# Patient Record
Sex: FEMALE | Race: WHITE | HISPANIC OR LATINO | Employment: OTHER | URBAN - METROPOLITAN AREA
[De-identification: names, ages, dates, MRNs, and addresses within clinical notes are randomized per-mention and may not be internally consistent; named-entity substitution may affect disease eponyms.]

---

## 2018-09-28 ENCOUNTER — HOSPITAL ENCOUNTER (OUTPATIENT)
Facility: MEDICAL CENTER | Age: 73
End: 2018-09-28
Attending: OBSTETRICS & GYNECOLOGY | Admitting: OBSTETRICS & GYNECOLOGY
Payer: MEDICARE

## 2018-09-28 VITALS
OXYGEN SATURATION: 95 % | RESPIRATION RATE: 16 BRPM | HEART RATE: 64 BPM | TEMPERATURE: 98 F | BODY MASS INDEX: 36.71 KG/M2 | DIASTOLIC BLOOD PRESSURE: 58 MMHG | HEIGHT: 59 IN | SYSTOLIC BLOOD PRESSURE: 150 MMHG | WEIGHT: 182.1 LBS

## 2018-09-28 PROBLEM — N95.0 POSTMENOPAUSAL BLEEDING: Status: ACTIVE | Noted: 2018-09-28

## 2018-09-28 LAB
ANION GAP SERPL CALC-SCNC: 11 MMOL/L (ref 0–11.9)
BUN SERPL-MCNC: 17 MG/DL (ref 8–22)
CALCIUM SERPL-MCNC: 9.8 MG/DL (ref 8.5–10.5)
CHLORIDE SERPL-SCNC: 102 MMOL/L (ref 96–112)
CO2 SERPL-SCNC: 26 MMOL/L (ref 20–33)
CREAT SERPL-MCNC: 0.97 MG/DL (ref 0.5–1.4)
EKG IMPRESSION: NORMAL
GLUCOSE SERPL-MCNC: 103 MG/DL (ref 65–99)
POTASSIUM SERPL-SCNC: 3.2 MMOL/L (ref 3.6–5.5)
SODIUM SERPL-SCNC: 139 MMOL/L (ref 135–145)

## 2018-09-28 PROCEDURE — 700111 HCHG RX REV CODE 636 W/ 250 OVERRIDE (IP): Performed by: OBSTETRICS & GYNECOLOGY

## 2018-09-28 PROCEDURE — 700111 HCHG RX REV CODE 636 W/ 250 OVERRIDE (IP)

## 2018-09-28 PROCEDURE — 88305 TISSUE EXAM BY PATHOLOGIST: CPT

## 2018-09-28 PROCEDURE — 160025 RECOVERY II MINUTES (STATS): Performed by: OBSTETRICS & GYNECOLOGY

## 2018-09-28 PROCEDURE — 500865 HCHG NEEDLE, SPINAL 20G/22G: Performed by: OBSTETRICS & GYNECOLOGY

## 2018-09-28 PROCEDURE — 700101 HCHG RX REV CODE 250

## 2018-09-28 PROCEDURE — 88360 TUMOR IMMUNOHISTOCHEM/MANUAL: CPT

## 2018-09-28 PROCEDURE — 160002 HCHG RECOVERY MINUTES (STAT): Performed by: OBSTETRICS & GYNECOLOGY

## 2018-09-28 PROCEDURE — 500448 HCHG DRESSING, TELFA 3X4: Performed by: OBSTETRICS & GYNECOLOGY

## 2018-09-28 PROCEDURE — A9270 NON-COVERED ITEM OR SERVICE: HCPCS | Performed by: ANESTHESIOLOGY

## 2018-09-28 PROCEDURE — 93010 ELECTROCARDIOGRAM REPORT: CPT | Performed by: INTERNAL MEDICINE

## 2018-09-28 PROCEDURE — 160009 HCHG ANES TIME/MIN: Performed by: OBSTETRICS & GYNECOLOGY

## 2018-09-28 PROCEDURE — 88342 IMHCHEM/IMCYTCHM 1ST ANTB: CPT

## 2018-09-28 PROCEDURE — 80048 BASIC METABOLIC PNL TOTAL CA: CPT

## 2018-09-28 PROCEDURE — 93005 ELECTROCARDIOGRAM TRACING: CPT | Performed by: OBSTETRICS & GYNECOLOGY

## 2018-09-28 PROCEDURE — 160036 HCHG PACU - EA ADDL 30 MINS PHASE I: Performed by: OBSTETRICS & GYNECOLOGY

## 2018-09-28 PROCEDURE — A6404 STERILE GAUZE > 48 SQ IN: HCPCS | Performed by: OBSTETRICS & GYNECOLOGY

## 2018-09-28 PROCEDURE — 700102 HCHG RX REV CODE 250 W/ 637 OVERRIDE(OP): Performed by: ANESTHESIOLOGY

## 2018-09-28 PROCEDURE — 160046 HCHG PACU - 1ST 60 MINS PHASE II: Performed by: OBSTETRICS & GYNECOLOGY

## 2018-09-28 PROCEDURE — 160029 HCHG SURGERY MINUTES - 1ST 30 MINS LEVEL 4: Performed by: OBSTETRICS & GYNECOLOGY

## 2018-09-28 PROCEDURE — 160041 HCHG SURGERY MINUTES - EA ADDL 1 MIN LEVEL 4: Performed by: OBSTETRICS & GYNECOLOGY

## 2018-09-28 PROCEDURE — 502587 HCHG PACK, D&C: Performed by: OBSTETRICS & GYNECOLOGY

## 2018-09-28 PROCEDURE — 160048 HCHG OR STATISTICAL LEVEL 1-5: Performed by: OBSTETRICS & GYNECOLOGY

## 2018-09-28 PROCEDURE — 160047 HCHG PACU  - EA ADDL 30 MINS PHASE II: Performed by: OBSTETRICS & GYNECOLOGY

## 2018-09-28 PROCEDURE — 160035 HCHG PACU - 1ST 60 MINS PHASE I: Performed by: OBSTETRICS & GYNECOLOGY

## 2018-09-28 RX ORDER — HYDROMORPHONE HYDROCHLORIDE 2 MG/ML
0.2 INJECTION, SOLUTION INTRAMUSCULAR; INTRAVENOUS; SUBCUTANEOUS
Status: DISCONTINUED | OUTPATIENT
Start: 2018-09-28 | End: 2018-09-28 | Stop reason: HOSPADM

## 2018-09-28 RX ORDER — MIDAZOLAM HYDROCHLORIDE 1 MG/ML
1 INJECTION INTRAMUSCULAR; INTRAVENOUS
Status: DISCONTINUED | OUTPATIENT
Start: 2018-09-28 | End: 2018-09-28 | Stop reason: HOSPADM

## 2018-09-28 RX ORDER — OXYCODONE HYDROCHLORIDE AND ACETAMINOPHEN 5; 325 MG/1; MG/1
2 TABLET ORAL
Status: COMPLETED | OUTPATIENT
Start: 2018-09-28 | End: 2018-09-28

## 2018-09-28 RX ORDER — IBUPROFEN 800 MG/1
800 TABLET ORAL EVERY 8 HOURS PRN
Status: ON HOLD | COMMUNITY
End: 2018-12-14

## 2018-09-28 RX ORDER — HYDROMORPHONE HYDROCHLORIDE 2 MG/ML
0.4 INJECTION, SOLUTION INTRAMUSCULAR; INTRAVENOUS; SUBCUTANEOUS
Status: DISCONTINUED | OUTPATIENT
Start: 2018-09-28 | End: 2018-09-28 | Stop reason: HOSPADM

## 2018-09-28 RX ORDER — SODIUM CHLORIDE, SODIUM LACTATE, POTASSIUM CHLORIDE, CALCIUM CHLORIDE 600; 310; 30; 20 MG/100ML; MG/100ML; MG/100ML; MG/100ML
INJECTION, SOLUTION INTRAVENOUS CONTINUOUS
Status: ACTIVE | OUTPATIENT
Start: 2018-09-28 | End: 2018-09-28

## 2018-09-28 RX ORDER — CHLORAL HYDRATE 500 MG
1000 CAPSULE ORAL
Status: ON HOLD | COMMUNITY
End: 2018-12-14

## 2018-09-28 RX ORDER — METOPROLOL SUCCINATE 25 MG/1
25 TABLET, EXTENDED RELEASE ORAL DAILY
COMMUNITY

## 2018-09-28 RX ORDER — HYDRALAZINE HYDROCHLORIDE 20 MG/ML
5 INJECTION INTRAMUSCULAR; INTRAVENOUS
Status: DISCONTINUED | OUTPATIENT
Start: 2018-09-28 | End: 2018-09-28 | Stop reason: HOSPADM

## 2018-09-28 RX ORDER — METOPROLOL TARTRATE 1 MG/ML
1 INJECTION, SOLUTION INTRAVENOUS
Status: DISCONTINUED | OUTPATIENT
Start: 2018-09-28 | End: 2018-09-28 | Stop reason: HOSPADM

## 2018-09-28 RX ORDER — BUPIVACAINE HYDROCHLORIDE 2.5 MG/ML
INJECTION, SOLUTION EPIDURAL; INFILTRATION; INTRACAUDAL
Status: DISCONTINUED | OUTPATIENT
Start: 2018-09-28 | End: 2018-09-28 | Stop reason: HOSPADM

## 2018-09-28 RX ORDER — HALOPERIDOL 5 MG/ML
1 INJECTION INTRAMUSCULAR
Status: DISCONTINUED | OUTPATIENT
Start: 2018-09-28 | End: 2018-09-28 | Stop reason: HOSPADM

## 2018-09-28 RX ORDER — OXYCODONE HYDROCHLORIDE AND ACETAMINOPHEN 5; 325 MG/1; MG/1
1 TABLET ORAL
Status: COMPLETED | OUTPATIENT
Start: 2018-09-28 | End: 2018-09-28

## 2018-09-28 RX ORDER — ONDANSETRON 2 MG/ML
4 INJECTION INTRAMUSCULAR; INTRAVENOUS
Status: DISCONTINUED | OUTPATIENT
Start: 2018-09-28 | End: 2018-09-28 | Stop reason: HOSPADM

## 2018-09-28 RX ORDER — HYDROMORPHONE HYDROCHLORIDE 2 MG/ML
0.1 INJECTION, SOLUTION INTRAMUSCULAR; INTRAVENOUS; SUBCUTANEOUS
Status: DISCONTINUED | OUTPATIENT
Start: 2018-09-28 | End: 2018-09-28 | Stop reason: HOSPADM

## 2018-09-28 RX ORDER — TRIAMTERENE AND HYDROCHLOROTHIAZIDE 75; 50 MG/1; MG/1
1 TABLET ORAL DAILY
COMMUNITY

## 2018-09-28 RX ORDER — SODIUM CHLORIDE, SODIUM LACTATE, POTASSIUM CHLORIDE, CALCIUM CHLORIDE 600; 310; 30; 20 MG/100ML; MG/100ML; MG/100ML; MG/100ML
INJECTION, SOLUTION INTRAVENOUS CONTINUOUS
Status: DISCONTINUED | OUTPATIENT
Start: 2018-09-28 | End: 2018-09-28 | Stop reason: HOSPADM

## 2018-09-28 RX ORDER — POTASSIUM CHLORIDE 20 MEQ/1
20 TABLET, EXTENDED RELEASE ORAL DAILY
COMMUNITY

## 2018-09-28 RX ORDER — LATANOPROST 50 UG/ML
1 SOLUTION/ DROPS OPHTHALMIC
COMMUNITY

## 2018-09-28 RX ORDER — HALOBETASOL PROPIONATE 0.05 %
1 OINTMENT (GRAM) TOPICAL DAILY
Status: ON HOLD | COMMUNITY
End: 2018-12-14

## 2018-09-28 RX ORDER — LABETALOL HYDROCHLORIDE 5 MG/ML
5 INJECTION, SOLUTION INTRAVENOUS
Status: DISCONTINUED | OUTPATIENT
Start: 2018-09-28 | End: 2018-09-28 | Stop reason: HOSPADM

## 2018-09-28 RX ADMIN — LIDOCAINE HYDROCHLORIDE 0.5 ML: 10 INJECTION, SOLUTION EPIDURAL; INFILTRATION; INTRACAUDAL; PERINEURAL at 08:05

## 2018-09-28 RX ADMIN — OXYCODONE AND ACETAMINOPHEN 2 TABLET: 5; 325 TABLET ORAL at 10:10

## 2018-09-28 RX ADMIN — SODIUM CHLORIDE, SODIUM LACTATE, POTASSIUM CHLORIDE, CALCIUM CHLORIDE: 600; 310; 30; 20 INJECTION, SOLUTION INTRAVENOUS at 08:05

## 2018-09-28 ASSESSMENT — PAIN SCALES - GENERAL
PAINLEVEL_OUTOF10: 2
PAINLEVEL_OUTOF10: 0
PAINLEVEL_OUTOF10: 2
PAINLEVEL_OUTOF10: 0
PAINLEVEL_OUTOF10: 2
PAINLEVEL_OUTOF10: 0
PAINLEVEL_OUTOF10: 0

## 2018-09-28 NOTE — OP REPORT
DATE OF SERVICE:  09/28/2018    PREOPERATIVE DIAGNOSES:  1.  Postmenopausal bleeding.  2.  Thickened endometrium.    POSTOPERATIVE DIAGNOSES:  1.  Postmenopausal bleeding.  2.  Thickened endometrium.    PROCEDURE PERFORMED:  Diagnostic hysteroscopy with dilation and curettage.    SURGEON:  Mickey Wray MD    ANESTHESIOLOGIST:  Abel Cramer DO    ANESTHESIA:  General with ET tube.    ESTIMATED BLOOD LOSS:  10 mL.    COMPLICATIONS:  None.    SPECIMENS:  Endometrial curettings.    INDICATIONS:  The patient is a 72-year-old postmenopausal woman who complains   of 2 months of intermittent dark blood tinged discharge.  She underwent a   transvaginal ultrasound, which showed a thickened endometrial lining at 24 mm.    She presented to clinic for endometrial biopsy, which was abandoned in the   office due to cervical stenosis and patient discomfort.  The decision was made   to proceed with diagnostic hysteroscopy and dilation and curettage in the   operating room.    FINDINGS:  Thickened endometrium, possible polyp.    PROCEDURE IN DETAIL:  Patient was taken to the operating room where her   general anesthesia was found to be adequate.  She was prepped and draped in   the normal sterile fashion and placed in the dorsal lithotomy position in   Helio stirrups.  A sterile speculum was placed in the vagina.  The anterior   lip of the cervix was grasped with a sterile tenaculum.  An 8 mL of 0.25%   Marcaine with epinephrine was injected at 4 and 8 o'clock, taking care to   first aspirate to ensure that no lidocaine or epinephrine was injected   intravascularly.  The cervix was again noted to be stenosed.  Using lacrimal   duct probes, the cervix was opened and thus dilated to accommodate the   diagnostic hysteroscope.  The diagnostic hysteroscope was passed and a   polypoid structure was noted in a thickened fluffy endometrium.  The   diagnostic hysteroscope was removed.  A sharp curette was used to do a careful    global curettage until a gritty texture was felt throughout.  The polypoid   structure was noted to be removed and the background endometrium as well as   the presumed polyp was sent to pathology.  The cervix was noted to be   hemostatic.  The patient woke from anesthesia without difficulty and was taken   to the PACU in stable condition.       ____________________________________     MD FLORY Wetzel / LOKESH    DD:  09/28/2018 09:21:14  DT:  09/28/2018 09:48:54    D#:  [id number]  Job#:  [id number]

## 2018-09-28 NOTE — DISCHARGE INSTRUCTIONS
ACTIVITY: Rest and take it easy for the first 24 hours.  A responsible adult is recommended to remain with you during that time.  It is normal to feel sleepy.  We encourage you to not do anything that requires balance, judgment or coordination.    MILD FLU-LIKE SYMPTOMS ARE NORMAL. YOU MAY EXPERIENCE GENERALIZED MUSCLE ACHES, THROAT IRRITATION, HEADACHE AND/OR SOME NAUSEA.    FOR 24 HOURS DO NOT:  Drive, operate machinery or run household appliances.  Drink beer or alcoholic beverages.   Make important decisions or sign legal documents.    SPECIAL INSTRUCTIONS:   Patient Discharge Instructions:  No heavy lifting for 1 week  Pelvic rest (no tampons, no douche, no baths, no intercourse) for 1 week.  Call Dr. Wray office at [phone] for heavy vaginal bleeding or fever > 100.5 Degrees F      DIET: To avoid nausea, slowly advance diet as tolerated, avoiding spicy or greasy foods for the first day.  Add more substantial food to your diet according to your physician's instructions.  Babies can be fed formula or breast milk as soon as they are hungry.  INCREASE FLUIDS AND FIBER TO AVOID CONSTIPATION.    SURGICAL DRESSING/BATHING: No baths for 1 week, showering is ok.    FOLLOW-UP APPOINTMENT:  A follow-up appointment should be arranged with your doctor in 1-2 weeks call to schedule.    You should CALL YOUR PHYSICIAN if you develop:  Fever greater than 101 degrees F.  Pain not relieved by medication, or persistent nausea or vomiting.  Excessive bleeding (blood soaking through dressing) or unexpected drainage from the wound.  Extreme redness or swelling around the incision site, drainage of pus or foul smelling drainage.  Inability to urinate or empty your bladder within 8 hours.  Problems with breathing or chest pain.    You should call 911 if you develop problems with breathing or chest pain.  If you are unable to contact your doctor or surgical center, you should go to the nearest emergency room or urgent care  center.  Physician's telephone #: [phone].    If any questions arise, call your doctor.  If your doctor is not available, please feel free to call the Surgical Center at [phone].  The Center is open Monday through Friday from 7AM to 7PM.  You can also call the HEALTH HOTLINE open 24 hours/day, 7 days/week and speak to a nurse at [phone], or toll free at [phone].    A registered nurse may call you a few days after your surgery to see how you are doing after your procedure.    MEDICATIONS: Resume taking daily medication.  Take prescribed pain medication with food.  If no medication is prescribed, you may take non-aspirin pain medication if needed.  PAIN MEDICATION CAN BE VERY CONSTIPATING.  Take a stool softener or laxative such as senokot, pericolace, or milk of magnesia if needed.    No prescriptions given,  Last pain medication given at 10:10 am.    If your physician has prescribed pain medication that includes Acetaminophen (Tylenol), do not take additional Acetaminophen (Tylenol) while taking the prescribed medication.    Depression / Suicide Risk    As you are discharged from this [facility] facility, it is important to learn how to keep safe from harming yourself.    Recognize the warning signs:  · Abrupt changes in personality, positive or negative- including increase in energy   · Giving away possessions  · Change in eating patterns- significant weight changes-  positive or negative  · Change in sleeping patterns- unable to sleep or sleeping all the time   · Unwillingness or inability to communicate  · Depression  · Unusual sadness, discouragement and loneliness  · Talk of wanting to die  · Neglect of personal appearance   · Rebelliousness- reckless behavior  · Withdrawal from people/activities they love  · Confusion- inability to concentrate     If you or a loved one observes any of these behaviors or has concerns about self-harm, here's what you can do:  · Talk about it- your  feelings and reasons for harming yourself  · Remove any means that you might use to hurt yourself (examples: pills, rope, extension cords, firearm)  · Get professional help from the community (Mental Health, Substance Abuse, psychological counseling)  · Do not be alone:Call your Safe Contact- someone whom you trust who will be there for you.  · Call your local CRISIS HOTLINE 808-7274 or 892-485-1324  · Call your local Children's Mobile Crisis Response Team [place] (820) 622-1246 or www.[employer]  · Call the toll free National Suicide Prevention Hotlines   · National Suicide Prevention Lifeline 028-258-NCYX (3874)  · National Hope Line Network 800-SUICIDE (817-4472)

## 2018-09-28 NOTE — OR NURSING
POSTOP D & C    PT ARRIVED SLEEPY WITH VAGINAL PERIPAD WITH MESH UNDERGARMENTS IN PLACE, SCANT SEROSANGUINEOUS DRAINAGE OBSERVED. PT AWOKE AND DENIED NAUSEA OR PAIN, PO FLUIDS BEGAN. PT REPORTED MINIMAL SURGICAL SITE DISCOMFORT, SALTINES GIVEN TO PREPARE FOR PO PRN PAIN MEDS FOR DISCHARGE PLANNING.    PT TOLERATED PO FLUIDS, SNACK AND PO PRN PAIN MEDICATION, IS X 10 BEGAN IN PHASE I, PT REPORTED USING HOME 02 RECENTLY BUT STOPPED DUE TO COSTS, HEALTH HISTORY OF SOB WHICH PT REPORTED RELATED TO ALLERGIES. IS X 10 Q 15 MIN AWAKE FOR DISCHARGE PLANNING, PT WAS SUCCESSFUL IN TEACH BACK AND IS AWARE THIS IMPORTANT FOR NEXT 2-3 POSTOP DAYS TO PREVENT PNEUMONIA.    PT MET ASPAN PHASE I CRITERIA, FRIEND WAS UPDATED AND CAME TO SURGICAL DESK FOR DISCHARGE INSTRUCTIONS AND TO ASSIST PT AT HOME. PT DOES USE WALKER AT HOME DUE DID NOT BRING TO HOSPITAL TODAY.

## 2018-09-28 NOTE — OP REPORT
Short Op Note    Procedure date: 9/28/2018  Pre-op diagnosis: postmenopausal bleeding, thickened EMS  Post-op diagnosis: same    Procedure: Hysteroscopy D&C  Surgeon: Nils  EBL: 10 cc  Complications: none  Specimens: Endometrial curettings

## 2018-09-28 NOTE — OR NURSING
Pt up with standby assist to bathroom. Slight serosanguinous drainage on cuco pad. Discussed with pt that she has some oxygen desaturation when sleeping, pt states she has sleep apnea and has a machine at home (that she has stopped using) but she will start using again tonight. Pt discharged to friend khoi, taken to car by wheelchair. Discharge instructions given to pt and friend, no further questions.

## 2018-10-17 ENCOUNTER — TELEPHONE (OUTPATIENT)
Dept: OTHER | Facility: MEDICAL CENTER | Age: 73
End: 2018-10-17

## 2018-10-17 NOTE — TELEPHONE ENCOUNTER
Telephone call to pt to follow up after surgery.  No answer, left message requesting return call.

## 2018-12-14 ENCOUNTER — APPOINTMENT (OUTPATIENT)
Dept: RADIOLOGY | Facility: MEDICAL CENTER | Age: 73
End: 2018-12-14
Attending: NURSE PRACTITIONER
Payer: MEDICARE

## 2018-12-14 ENCOUNTER — APPOINTMENT (OUTPATIENT)
Dept: RADIOLOGY | Facility: MEDICAL CENTER | Age: 73
End: 2018-12-14
Attending: SPECIALIST
Payer: MEDICARE

## 2018-12-14 ENCOUNTER — HOSPITAL ENCOUNTER (OUTPATIENT)
Facility: MEDICAL CENTER | Age: 73
End: 2018-12-14
Attending: SPECIALIST | Admitting: SPECIALIST
Payer: MEDICARE

## 2018-12-14 VITALS
HEIGHT: 59 IN | HEART RATE: 59 BPM | OXYGEN SATURATION: 92 % | WEIGHT: 172.18 LBS | BODY MASS INDEX: 34.71 KG/M2 | TEMPERATURE: 97.6 F | RESPIRATION RATE: 18 BRPM

## 2018-12-14 DIAGNOSIS — G89.18 POSTOPERATIVE PAIN: ICD-10-CM

## 2018-12-14 LAB
ABO GROUP BLD: NORMAL
ABO GROUP BLD: NORMAL
ALBUMIN SERPL BCP-MCNC: 4.6 G/DL (ref 3.2–4.9)
ALBUMIN/GLOB SERPL: 1.4 G/DL
ALP SERPL-CCNC: 72 U/L (ref 30–99)
ALT SERPL-CCNC: 53 U/L (ref 2–50)
ANION GAP SERPL CALC-SCNC: 11 MMOL/L (ref 0–11.9)
APTT PPP: 30.5 SEC (ref 24.7–36)
AST SERPL-CCNC: 32 U/L (ref 12–45)
BASOPHILS # BLD AUTO: 0.6 % (ref 0–1.8)
BASOPHILS # BLD: 0.05 K/UL (ref 0–0.12)
BILIRUB SERPL-MCNC: 0.7 MG/DL (ref 0.1–1.5)
BLD GP AB SCN SERPL QL: NORMAL
BUN SERPL-MCNC: 13 MG/DL (ref 8–22)
CALCIUM SERPL-MCNC: 9.9 MG/DL (ref 8.5–10.5)
CANCER AG125 SERPL-ACNC: 21.4 U/ML (ref 0–35)
CHLORIDE SERPL-SCNC: 99 MMOL/L (ref 96–112)
CO2 SERPL-SCNC: 28 MMOL/L (ref 20–33)
CREAT SERPL-MCNC: 0.71 MG/DL (ref 0.5–1.4)
EOSINOPHIL # BLD AUTO: 0.11 K/UL (ref 0–0.51)
EOSINOPHIL NFR BLD: 1.3 % (ref 0–6.9)
ERYTHROCYTE [DISTWIDTH] IN BLOOD BY AUTOMATED COUNT: 43.2 FL (ref 35.9–50)
GLOBULIN SER CALC-MCNC: 3.2 G/DL (ref 1.9–3.5)
GLUCOSE SERPL-MCNC: 95 MG/DL (ref 65–99)
HCT VFR BLD AUTO: 43 % (ref 37–47)
HGB BLD-MCNC: 13.8 G/DL (ref 12–16)
IMM GRANULOCYTES # BLD AUTO: 0.03 K/UL (ref 0–0.11)
IMM GRANULOCYTES NFR BLD AUTO: 0.4 % (ref 0–0.9)
INR PPP: 1.04 (ref 0.87–1.13)
LYMPHOCYTES # BLD AUTO: 2.38 K/UL (ref 1–4.8)
LYMPHOCYTES NFR BLD: 28.7 % (ref 22–41)
MCH RBC QN AUTO: 29.5 PG (ref 27–33)
MCHC RBC AUTO-ENTMCNC: 32.1 G/DL (ref 33.6–35)
MCV RBC AUTO: 91.9 FL (ref 81.4–97.8)
MONOCYTES # BLD AUTO: 0.61 K/UL (ref 0–0.85)
MONOCYTES NFR BLD AUTO: 7.4 % (ref 0–13.4)
NEUTROPHILS # BLD AUTO: 5.11 K/UL (ref 2–7.15)
NEUTROPHILS NFR BLD: 61.6 % (ref 44–72)
NRBC # BLD AUTO: 0 K/UL
NRBC BLD-RTO: 0 /100 WBC
PATHOLOGY CONSULT NOTE: NORMAL
PLATELET # BLD AUTO: 270 K/UL (ref 164–446)
PMV BLD AUTO: 9.6 FL (ref 9–12.9)
POTASSIUM SERPL-SCNC: 3.3 MMOL/L (ref 3.6–5.5)
PROT SERPL-MCNC: 7.8 G/DL (ref 6–8.2)
PROTHROMBIN TIME: 13.7 SEC (ref 12–14.6)
RBC # BLD AUTO: 4.68 M/UL (ref 4.2–5.4)
RH BLD: NORMAL
RH BLD: NORMAL
SODIUM SERPL-SCNC: 138 MMOL/L (ref 135–145)
WBC # BLD AUTO: 8.3 K/UL (ref 4.8–10.8)

## 2018-12-14 PROCEDURE — 88112 CYTOPATH CELL ENHANCE TECH: CPT

## 2018-12-14 PROCEDURE — 88309 TISSUE EXAM BY PATHOLOGIST: CPT

## 2018-12-14 PROCEDURE — 160042 HCHG SURGERY MINUTES - EA ADDL 1 MIN LEVEL 5: Performed by: SPECIALIST

## 2018-12-14 PROCEDURE — 88331 PATH CONSLTJ SURG 1 BLK 1SPC: CPT

## 2018-12-14 PROCEDURE — A9270 NON-COVERED ITEM OR SERVICE: HCPCS

## 2018-12-14 PROCEDURE — 700111 HCHG RX REV CODE 636 W/ 250 OVERRIDE (IP): Performed by: ANESTHESIOLOGY

## 2018-12-14 PROCEDURE — 700102 HCHG RX REV CODE 250 W/ 637 OVERRIDE(OP): Performed by: SPECIALIST

## 2018-12-14 PROCEDURE — 86850 RBC ANTIBODY SCREEN: CPT

## 2018-12-14 PROCEDURE — 88342 IMHCHEM/IMCYTCHM 1ST ANTB: CPT | Mod: XU

## 2018-12-14 PROCEDURE — 85730 THROMBOPLASTIN TIME PARTIAL: CPT

## 2018-12-14 PROCEDURE — 85610 PROTHROMBIN TIME: CPT

## 2018-12-14 PROCEDURE — 700101 HCHG RX REV CODE 250

## 2018-12-14 PROCEDURE — 80053 COMPREHEN METABOLIC PANEL: CPT

## 2018-12-14 PROCEDURE — 74018 RADEX ABDOMEN 1 VIEW: CPT

## 2018-12-14 PROCEDURE — 700111 HCHG RX REV CODE 636 W/ 250 OVERRIDE (IP)

## 2018-12-14 PROCEDURE — 502779 HCHG SUTURE, QUILL: Performed by: SPECIALIST

## 2018-12-14 PROCEDURE — 160035 HCHG PACU - 1ST 60 MINS PHASE I: Performed by: SPECIALIST

## 2018-12-14 PROCEDURE — 700105 HCHG RX REV CODE 258: Performed by: SPECIALIST

## 2018-12-14 PROCEDURE — 160048 HCHG OR STATISTICAL LEVEL 1-5: Performed by: SPECIALIST

## 2018-12-14 PROCEDURE — 160047 HCHG PACU  - EA ADDL 30 MINS PHASE II: Performed by: SPECIALIST

## 2018-12-14 PROCEDURE — 88305 TISSUE EXAM BY PATHOLOGIST: CPT

## 2018-12-14 PROCEDURE — 86901 BLOOD TYPING SEROLOGIC RH(D): CPT

## 2018-12-14 PROCEDURE — 88341 IMHCHEM/IMCYTCHM EA ADD ANTB: CPT | Mod: 91

## 2018-12-14 PROCEDURE — 86900 BLOOD TYPING SEROLOGIC ABO: CPT

## 2018-12-14 PROCEDURE — 85025 COMPLETE CBC W/AUTO DIFF WBC: CPT

## 2018-12-14 PROCEDURE — 71045 X-RAY EXAM CHEST 1 VIEW: CPT

## 2018-12-14 PROCEDURE — 160002 HCHG RECOVERY MINUTES (STAT): Performed by: SPECIALIST

## 2018-12-14 PROCEDURE — 700102 HCHG RX REV CODE 250 W/ 637 OVERRIDE(OP)

## 2018-12-14 PROCEDURE — 88307 TISSUE EXAM BY PATHOLOGIST: CPT

## 2018-12-14 PROCEDURE — 160031 HCHG SURGERY MINUTES - 1ST 30 MINS LEVEL 5: Performed by: SPECIALIST

## 2018-12-14 PROCEDURE — 500854 HCHG NEEDLE, INSUFFLATION FOR STEP: Performed by: SPECIALIST

## 2018-12-14 PROCEDURE — 86304 IMMUNOASSAY TUMOR CA 125: CPT

## 2018-12-14 PROCEDURE — 501582 HCHG TROCAR, THRD BLADED: Performed by: SPECIALIST

## 2018-12-14 PROCEDURE — 88360 TUMOR IMMUNOHISTOCHEM/MANUAL: CPT

## 2018-12-14 PROCEDURE — 160046 HCHG PACU - 1ST 60 MINS PHASE II: Performed by: SPECIALIST

## 2018-12-14 PROCEDURE — 88332 PATH CONSLTJ SURG EA ADD BLK: CPT

## 2018-12-14 PROCEDURE — 160009 HCHG ANES TIME/MIN: Performed by: SPECIALIST

## 2018-12-14 PROCEDURE — 160025 RECOVERY II MINUTES (STATS): Performed by: SPECIALIST

## 2018-12-14 PROCEDURE — 700104 HCHG RX REV CODE 254

## 2018-12-14 PROCEDURE — 160036 HCHG PACU - EA ADDL 30 MINS PHASE I: Performed by: SPECIALIST

## 2018-12-14 PROCEDURE — A9270 NON-COVERED ITEM OR SERVICE: HCPCS | Performed by: SPECIALIST

## 2018-12-14 PROCEDURE — 700101 HCHG RX REV CODE 250: Performed by: SPECIALIST

## 2018-12-14 PROCEDURE — 502714 HCHG ROBOTIC SURGERY SERVICES: Performed by: SPECIALIST

## 2018-12-14 PROCEDURE — 500864 HCHG NEEDLE, SPINAL 18G: Performed by: SPECIALIST

## 2018-12-14 PROCEDURE — 501838 HCHG SUTURE GENERAL: Performed by: SPECIALIST

## 2018-12-14 RX ORDER — DIPHENHYDRAMINE HYDROCHLORIDE 50 MG/ML
12.5 INJECTION INTRAMUSCULAR; INTRAVENOUS
Status: DISCONTINUED | OUTPATIENT
Start: 2018-12-14 | End: 2018-12-15 | Stop reason: HOSPADM

## 2018-12-14 RX ORDER — ONDANSETRON 4 MG/1
4 TABLET, ORALLY DISINTEGRATING ORAL EVERY 6 HOURS PRN
Qty: 10 TAB | Refills: 0 | Status: SHIPPED | OUTPATIENT
Start: 2018-12-14 | End: 2022-06-30

## 2018-12-14 RX ORDER — INDOCYANINE GREEN AND WATER 25 MG
KIT INJECTION
Status: DISCONTINUED | OUTPATIENT
Start: 2018-12-14 | End: 2018-12-14 | Stop reason: HOSPADM

## 2018-12-14 RX ORDER — BUPIVACAINE HYDROCHLORIDE AND EPINEPHRINE 2.5; 5 MG/ML; UG/ML
INJECTION, SOLUTION EPIDURAL; INFILTRATION; INTRACAUDAL; PERINEURAL
Status: DISCONTINUED | OUTPATIENT
Start: 2018-12-14 | End: 2018-12-14 | Stop reason: HOSPADM

## 2018-12-14 RX ORDER — OXYCODONE HYDROCHLORIDE AND ACETAMINOPHEN 5; 325 MG/1; MG/1
1 TABLET ORAL EVERY 4 HOURS PRN
Status: DISCONTINUED | OUTPATIENT
Start: 2018-12-14 | End: 2018-12-15 | Stop reason: HOSPADM

## 2018-12-14 RX ORDER — LIDOCAINE HYDROCHLORIDE 10 MG/ML
INJECTION, SOLUTION INFILTRATION; PERINEURAL
Status: COMPLETED
Start: 2018-12-14 | End: 2018-12-14

## 2018-12-14 RX ORDER — OXYCODONE AND ACETAMINOPHEN 7.5; 325 MG/1; MG/1
1 TABLET ORAL EVERY 6 HOURS PRN
Qty: 56 TAB | Refills: 0 | Status: SHIPPED | OUTPATIENT
Start: 2018-12-14 | End: 2018-12-28

## 2018-12-14 RX ORDER — OXYCODONE HCL 20 MG/ML
5 CONCENTRATE, ORAL ORAL
Status: DISCONTINUED | OUTPATIENT
Start: 2018-12-14 | End: 2018-12-15 | Stop reason: HOSPADM

## 2018-12-14 RX ORDER — SODIUM CHLORIDE, SODIUM LACTATE, POTASSIUM CHLORIDE, CALCIUM CHLORIDE 600; 310; 30; 20 MG/100ML; MG/100ML; MG/100ML; MG/100ML
INJECTION, SOLUTION INTRAVENOUS CONTINUOUS
Status: ACTIVE | OUTPATIENT
Start: 2018-12-14 | End: 2018-12-14

## 2018-12-14 RX ORDER — ONDANSETRON 2 MG/ML
4 INJECTION INTRAMUSCULAR; INTRAVENOUS
Status: COMPLETED | OUTPATIENT
Start: 2018-12-14 | End: 2018-12-14

## 2018-12-14 RX ORDER — LABETALOL HYDROCHLORIDE 5 MG/ML
5 INJECTION, SOLUTION INTRAVENOUS
Status: ACTIVE | OUTPATIENT
Start: 2018-12-14 | End: 2018-12-14

## 2018-12-14 RX ORDER — OXYCODONE HCL 20 MG/ML
10 CONCENTRATE, ORAL ORAL
Status: DISCONTINUED | OUTPATIENT
Start: 2018-12-14 | End: 2018-12-15 | Stop reason: HOSPADM

## 2018-12-14 RX ORDER — MEPERIDINE HYDROCHLORIDE 25 MG/ML
6.25 INJECTION INTRAMUSCULAR; INTRAVENOUS; SUBCUTANEOUS
Status: DISCONTINUED | OUTPATIENT
Start: 2018-12-14 | End: 2018-12-15 | Stop reason: HOSPADM

## 2018-12-14 RX ORDER — SODIUM CHLORIDE, SODIUM LACTATE, POTASSIUM CHLORIDE, CALCIUM CHLORIDE 600; 310; 30; 20 MG/100ML; MG/100ML; MG/100ML; MG/100ML
INJECTION, SOLUTION INTRAVENOUS CONTINUOUS
Status: DISCONTINUED | OUTPATIENT
Start: 2018-12-14 | End: 2018-12-15 | Stop reason: HOSPADM

## 2018-12-14 RX ORDER — OXYCODONE HCL 5 MG/5 ML
SOLUTION, ORAL ORAL
Status: COMPLETED
Start: 2018-12-14 | End: 2018-12-14

## 2018-12-14 RX ORDER — HYDROMORPHONE HYDROCHLORIDE 1 MG/ML
0.2 INJECTION, SOLUTION INTRAMUSCULAR; INTRAVENOUS; SUBCUTANEOUS
Status: DISCONTINUED | OUTPATIENT
Start: 2018-12-14 | End: 2018-12-15 | Stop reason: HOSPADM

## 2018-12-14 RX ORDER — IBUPROFEN 200 MG
600 TABLET ORAL
COMMUNITY
End: 2022-06-30

## 2018-12-14 RX ADMIN — HYDROMORPHONE HYDROCHLORIDE 0.2 MG: 1 INJECTION, SOLUTION INTRAMUSCULAR; INTRAVENOUS; SUBCUTANEOUS at 12:32

## 2018-12-14 RX ADMIN — SODIUM CHLORIDE, SODIUM LACTATE, POTASSIUM CHLORIDE, CALCIUM CHLORIDE: 600; 310; 30; 20 INJECTION, SOLUTION INTRAVENOUS at 06:40

## 2018-12-14 RX ADMIN — LIDOCAINE HYDROCHLORIDE 0.5 ML: 10 INJECTION, SOLUTION INFILTRATION; PERINEURAL at 06:40

## 2018-12-14 RX ADMIN — Medication 0.5 ML: at 06:40

## 2018-12-14 RX ADMIN — HYDROMORPHONE HYDROCHLORIDE 0.2 MG: 1 INJECTION, SOLUTION INTRAMUSCULAR; INTRAVENOUS; SUBCUTANEOUS at 12:22

## 2018-12-14 RX ADMIN — OXYCODONE HYDROCHLORIDE 10 MG: 5 SOLUTION ORAL at 11:42

## 2018-12-14 RX ADMIN — ONDANSETRON 4 MG: 2 INJECTION INTRAMUSCULAR; INTRAVENOUS at 11:46

## 2018-12-14 ASSESSMENT — PAIN SCALES - GENERAL
PAINLEVEL_OUTOF10: 0
PAINLEVEL_OUTOF10: 2
PAINLEVEL_OUTOF10: 0
PAINLEVEL_OUTOF10: 3
PAINLEVEL_OUTOF10: 3
PAINLEVEL_OUTOF10: 6
PAINLEVEL_OUTOF10: 2
PAINLEVEL_OUTOF10: 5
PAINLEVEL_OUTOF10: 5

## 2018-12-14 NOTE — OR NURSING
Georgia has done well in recovery. She was medicated with IV and oral pain medication. Tolerating po fluids well and scripts are on the chart. She has slept for most of her recovery period. Given 500 ml bolus of LR for hypotension during her first hour of recovery. Blood pressure is now stable. Site to abdomen with scant blood, site to right side reinforced. Ice pack placed to abdomen. She is stable and ready to move to stage 2.

## 2018-12-14 NOTE — OR SURGEON
Immediate Post OP Note    PreOp Diagnosis: grade 1 endometrial cancer    PostOp Diagnosis: same    Procedure(s):  HYSTERECTOMY ROBOTIC XI - Wound Class: Clean Contaminated  SALPINGECTOMY - Wound Class: Clean Contaminated  OOPHORECTOMY - Wound Class: Clean Contaminated  NODE BIOPSY SENTINEL - Wound Class: Clean Contaminated    Surgeon(s):  Ab Trevino M.D.    Anesthesiologist/Type of Anesthesia:  Anesthesiologist: Abel Thomas Jr., M.D./General    Surgical Staff:  Circulator: Teresa Ordonez R.N.  Relief Circulator: Shamika Hernandez R.N.  Scrub Person: Zack Emmanuel R.N.; Rosy Aguiar    Specimens removed if any:  ID Type Source Tests Collected by Time Destination   A : pelvic washings Body Fluid Abdominal PATHOLOGY SPECIMEN, CYTOLOGY Ab Trevino M.D. 12/14/2018  8:51 AM    B : right lateral external iliac lymph node and left obturator node Tissue Abdominal PATHOLOGY SPECIMEN Ab Trevino M.D. 12/14/2018  8:55 AM    C : uterus, bilateral tubes and ovaries Tissue Vaginal PATHOLOGY SPECIMEN Ab Trevino M.D. 12/14/2018  9:20 AM        Estimated Blood Loss: 100 cc    Findings: left obturator sentinel node and right lateral external iliac node    Complications: none        12/14/2018 10:12 AM Ab Trevino M.D.

## 2018-12-14 NOTE — DISCHARGE INSTRUCTIONS
ACTIVITY: Rest and take it easy for the first 24 hours.  A responsible adult is recommended to remain with you during that time.  It is normal to feel sleepy.  We encourage you to not do anything that requires balance, judgment or coordination.    MILD FLU-LIKE SYMPTOMS ARE NORMAL. YOU MAY EXPERIENCE GENERALIZED MUSCLE ACHES, THROAT IRRITATION, HEADACHE AND/OR SOME NAUSEA.    FOR 24 HOURS DO NOT:  Drive, operate machinery or run household appliances.  Drink beer or alcoholic beverages.   Make important decisions or sign legal documents.    SPECIAL INSTRUCTIONS & SURGICAL DRESSING/BATHIN) Call [phone] to confirm you have a two week post operative examination   2) No heavy lifting greater than 10 pounds for a minimum of six weeks   3) Nothing in the vaginal for a minimum of six weeks   4) No driving while taking pain medication   5) Use Milk of Magnesia for constipation   6) May remove dressings post op day 1 (12/15/18)  7) Call [phone] for any concerns or issues    DIET: To avoid nausea, slowly advance diet as tolerated, avoiding spicy or greasy foods for the first day.  Add more substantial food to your diet according to your physician's instructions.  INCREASE FLUIDS AND FIBER TO AVOID CONSTIPATION.    FOLLOW-UP APPOINTMENT:  A follow-up appointment should be arranged with your doctor; call to schedule.    You should CALL YOUR PHYSICIAN if you develop:  Fever greater than 101 degrees F.  Pain not relieved by medication, or persistent nausea or vomiting.  Excessive bleeding (blood soaking through dressing) or unexpected drainage from the wound.  Extreme redness or swelling around the incision site, drainage of pus or foul smelling drainage.  Inability to urinate or empty your bladder within 8 hours.  Problems with breathing or chest pain.    You should call 911 if you develop problems with breathing or chest pain.  If you are unable to contact your doctor or surgical center, you should go to the  nearest emergency room or urgent care center.  Physician's telephone #: Dr. Trevino [phone]*    If any questions arise, call your doctor.  If your doctor is not available, please feel free to call the Surgical Center at [phone].  The Center is open Monday through Friday from 7AM to 7PM.  You can also call the HEALTH HOTLINE open 24 hours/day, 7 days/week and speak to a nurse at [phone], or toll free at [phone].    A registered nurse may call you a few days after your surgery to see how you are doing after your procedure.    MEDICATIONS: Resume taking daily medication.  Take prescribed pain medication with food.  If no medication is prescribed, you may take non-aspirin pain medication if needed.  PAIN MEDICATION CAN BE VERY CONSTIPATING.  Take a stool softener or laxative such as senokot, pericolace, or milk of magnesia if needed.    Prescription given for *Zofran and Percocet*.  Last pain medication given at 11:42.    If your physician has prescribed pain medication that includes Acetaminophen (Tylenol), do not take additional Acetaminophen (Tylenol) while taking the prescribed medication.    Depression / Suicide Risk    As you are discharged from this [facility] facility, it is important to learn how to keep safe from harming yourself.    Recognize the warning signs:  · Abrupt changes in personality, positive or negative- including increase in energy   · Giving away possessions  · Change in eating patterns- significant weight changes-  positive or negative  · Change in sleeping patterns- unable to sleep or sleeping all the time   · Unwillingness or inability to communicate  · Depression  · Unusual sadness, discouragement and loneliness  · Talk of wanting to die  · Neglect of personal appearance   · Rebelliousness- reckless behavior  · Withdrawal from people/activities they love  · Confusion- inability to concentrate     If you or a loved one observes any of these behaviors or has concerns  about self-harm, here's what you can do:  · Talk about it- your feelings and reasons for harming yourself  · Remove any means that you might use to hurt yourself (examples: pills, rope, extension cords, firearm)  · Get professional help from the community (Mental Health, Substance Abuse, psychological counseling)  · Do not be alone:Call your Safe Contact- someone whom you trust who will be there for you.  · Call your local CRISIS HOTLINE 991-6648 or 792-591-2929  · Call your local Children's Mobile Crisis Response Team [place] (964) 873-6466 or www.[employer]  · Call the toll free National Suicide Prevention Hotlines   · National Suicide Prevention Lifeline 851-124-DTYV (2080)  · National Hope Line Network 800-SUICIDE (979-5705)

## 2018-12-14 NOTE — OP REPORT
DATE OF SERVICE:  12/14/2018    PREOPERATIVE DIAGNOSES:  Grade I endometrial adenocarcinoma, morbid obesity.    POSTOPERATIVE DIAGNOSIS:  Grade I endometrial adenocarcinoma, morbid obesity.    PROCEDURE PERFORMED:  1.  [place] lymph node mapping.  2.  Robotic-assisted sentinel lymph node sampling.  3.  Robotic-assisted hysterectomy with bilateral salpingo-oophorectomy.  4.  Pelvic washings.    SURGEON:  Ab Trevino MD    ASSISTANTS:  OBI Raymundo    ANESTHESIA:  General.    ANESTHESIOLOGIST:  Abel Thomas MD    ESTIMATED BLOOD LOSS:  100 mL.    FLUIDS:  Per Dr. Thomas.    URINE OUTPUT:  As per Dr. Thomas.    COMPLICATIONS:  None.    COUNTS:  Final sponge and needle counts correct.    INDICATIONS FOR SURGERY:  The patient is a pleasant 73-year-old female who was   referred to me by Dr. Wray because of a grade I endometrial   adenocarcinoma.  Patient was seen by me for consultation.  Patient was advised   to undergo surgical pathological evaluation.  Risks, benefits, and rationale   of the procedures were reviewed with the patient in detail.  Patient is   understanding of these risks and wished to proceed with the surgery as   planned.    INTRAOPERATIVE FINDINGS:  1.  No evidence of ascites.  2.  Normal right and left diaphragm.  Liver capsule smooth.  Stomach appeared   grossly normal.  Abdominal peritoneal surfaces were unremarkable.  Omentum   appeared grossly normal.  The surgery was difficult due to the patient's body   habitus.  She has short truncation with short mesentery despite the 32-degree   Trendelenburg, her bowel kept falling in the operative field.  This is what   made the surgery difficult.    Pelvis, uterus was of normal size.  The adnexal structures were unremarkable.    [place] lymph node under Firefly revealed that on the left it was in the   obturator lymph node, on the right, it was lateral external iliac lymph node.    DESCRIPTION OF PROCEDURE:  Patient was given IV  antibiotics prior to   procedure.  Patient was prepped and draped and placed in modified dorsal   lithotomy position.  We proceeded on with the robotic portion of the   procedure.    A 1 cm supraumbilical incision was made.  A Veress needle was inserted without   difficulty.  Pneumoperitoneum was achieved to the abdominal pressure of 15   mmHg.  A 12 mm trocar was inserted without difficulty.  After completion of   this, a 12 mm trocar was placed in the left lower quadrant two fingerbreadths   medial to the anterior superior iliac spine under direct laparoscopic   visualization.  After completion of this, a laparoscope was then placed in the   left lower quadrant port to assist in the placement of the remainder of the   da Jared ports.  Two 8 mm ports were placed in the right upper quadrant 8 cm   apart while one 8 mm port was placed in the left upper quadrant 8 cm apart.    After completion of this, the patient was placed in steep Trendelenburg   position.  The robotic system was then docked and after docking the robotic   system, the instrumentation was inserted under direct laparoscopic   visualization to insure that there was no injury to the abdominal contents.    Once this was completed, the robotic camera was then docked.  We then   proceeded with our da Jared portion of the procedure.    Assistant port was placed in the left lower quadrant at the level of the   anterior superior iliac spine for bowel retraction.    I had opened up right and left posterior broad leaf ligament.  [place] lymph   node mapping was undertaken.  Under Firefly, we identified the sentinel lymph   node.  They were skeletonized and retrieved and sent for frozen section.    While waiting for frozen section, we proceeded on with the hysterectomy with   bilateral salpingo-oophorectomy.    The posterior leaf of the broad ligament was incised.  The avascular space of   Graves was then created.  The right and left pelvic ureters were  identified.    The ovarian vessels were then subsequently isolated and bipolar cautery was   used to cauterize the right and left IP and subsequently divided.  The medial   leaf of the peritoneum was then incised down to the level of the uterosacral   ligament.  Ureters were dissected laterally.  Uterine artery and vein were   then subsequently skeletonized.  Using the bipolar cautery, the uterine artery   and vein were then cauterized juxtaposition to the fundus of the uterus.  The   remainder of the lower uterine segment was likewise divided.  The uterosacral   ligaments were then independently divided.  Great care was then taken to   clearly not injure the ureter.  After the uterosacral ligaments were then   divided, the anterior branches of the uterine vessels were then subsequently   skeletonized and cauterized with bipolar cautery and divided.  The bladder   peritoneum was then subsequently taken down.  Once we were assured that the   bladder was dissected off the paracervical fascia, an anterior colpotomy was   made.  The vagina was circumferentially incised to complete the hysterectomy   and BSO.  The specimen was removed through the vaginal vault without   difficulty.  The vaginal cuff was then closed with O Quill PDS suture in 2   layer running fashion.    Pelvis was then copiously irrigated with water.  Hemostasis was established.    Once this was established, the frozen section came back as at least 50%   invasion.  Thus, we did not proceed with further surgical staging.    Patient tolerated the procedure well without any difficulties and was   subsequently transferred to the PACU in stable condition, extubated.       ____________________________________     MD GETACHEW PARKER / LOKESH    DD:  12/14/2018 10:18:08  DT:  12/14/2018 10:59:59    D#:  [id number]  Job#:  [id number]    cc: Shira PAUL, Mickey Wray MD

## 2018-12-15 NOTE — OR NURSING
Pt's VSS; denies N/V; states pain is at tolerable level. . D/c orders received. IV dc'd. Pt changed into clothing with assistance. Discharge instructions given; pt and family verbalized understanding and questions answered. Patient states ready to d/c home. Prescriptions given. Pt dc'd in w/c with CNA .

## 2019-01-10 ENCOUNTER — PATIENT OUTREACH (OUTPATIENT)
Dept: OTHER | Facility: MEDICAL CENTER | Age: 74
End: 2019-01-10

## 2019-01-10 NOTE — PROGRESS NOTES
Referral received from Dr. Trevino.  Telephone call to pt.  No answer, left message requesting return call.

## 2019-01-11 NOTE — PROGRESS NOTES
Pt returned call.  Introduced myself and the NN role and provided contact information. Support & services offered.  No needs verbalized at this time. Plan to meet with pt after radiation consult.

## 2019-01-15 ENCOUNTER — PATIENT OUTREACH (OUTPATIENT)
Dept: OTHER | Facility: MEDICAL CENTER | Age: 74
End: 2019-01-15

## 2019-01-15 ENCOUNTER — HOSPITAL ENCOUNTER (OUTPATIENT)
Dept: RADIATION ONCOLOGY | Facility: MEDICAL CENTER | Age: 74
End: 2019-01-31
Attending: RADIOLOGY
Payer: MEDICARE

## 2019-01-15 VITALS
HEART RATE: 84 BPM | SYSTOLIC BLOOD PRESSURE: 102 MMHG | WEIGHT: 169 LBS | DIASTOLIC BLOOD PRESSURE: 89 MMHG | HEIGHT: 59 IN | TEMPERATURE: 98.2 F | BODY MASS INDEX: 34.07 KG/M2 | OXYGEN SATURATION: 98 %

## 2019-01-15 DIAGNOSIS — C80.1 CANCER (HCC): ICD-10-CM

## 2019-01-15 DIAGNOSIS — C54.1 ENDOMETRIAL CANCER (HCC): ICD-10-CM

## 2019-01-15 PROCEDURE — 99205 OFFICE O/P NEW HI 60 MIN: CPT | Mod: 25 | Performed by: RADIOLOGY

## 2019-01-15 PROCEDURE — 99214 OFFICE O/P EST MOD 30 MIN: CPT | Mod: 25 | Performed by: RADIOLOGY

## 2019-01-15 PROCEDURE — 49411 INS MARK ABD/PEL FOR RT PERQ: CPT | Performed by: RADIOLOGY

## 2019-01-15 PROCEDURE — A4648 IMPLANTABLE TISSUE MARKER: HCPCS | Performed by: RADIOLOGY

## 2019-01-15 RX ORDER — CLOTRIMAZOLE AND BETAMETHASONE DIPROPIONATE 10; .64 MG/G; MG/G
CREAM TOPICAL 2 TIMES DAILY
COMMUNITY
End: 2022-06-30

## 2019-01-15 RX ORDER — OXYCODONE AND ACETAMINOPHEN 7.5; 325 MG/1; MG/1
1 TABLET ORAL EVERY 4 HOURS PRN
COMMUNITY
End: 2022-06-30

## 2019-01-15 NOTE — CONSULTS
RADIATION ONCOLOGY CONSULT    DATE OF SERVICE: 1/15/2019    IDENTIFICATION: A 73 y.o. female with stage II endometrioid adenocarcinoma status post robotic assisted KYLE/BSO 12/14/2018.  She is here at the kind request of Dr. Trevino for consideration of adjuvant therapy.      HISTORY OF PRESENT ILLNESS: Patient presented with postmenopausal bleeding.  She underwent an endometrial biopsy demonstrating grade 1 endometrioid adenocarcinoma.  She was subsequently referred for surgical intervention and underwent KYLE/BSO and sentinel node biopsy on 12/14/2018.  Pathology demonstrated grade 2 endometrioid adenocarcinoma with deep myometrial invasion greater than 50%, tumor measured 5.1 cm in size.  Tumor involved lower uterine segment.  Tumor involves cervical stroma.  There was no evidence of lymphovascular invasion.  2 sentinel lymph nodes were uninvolved with cancer.  Mismatch repair proteins were performed mL H1 and PMS-2 or not expressed.  MLH1 hypermethylation studies were ordered and pending.    She is now approximately 4 weeks postop.  She reports normal bowel and bladder function.  She denies vaginal bleeding or discharge.  She reports some pruritus at the robotic port sites.  Otherwise she is well without complaints.    PAST MEDICAL HISTORY:   Past Medical History:   Diagnosis Date   • Arthritis     back, hips, fingers   • Asthma    • Breath shortness     feels congested and short of breath easily   • Cancer (HCC)     endometrial cancer   • Eczema    • Glaucoma    • Gynecological disorder     irrgular postmenopausal bleeding   • Hypertension    • Polio     7 years old, joints are more fragile   • Stroke (HCC) 2014    no deficits, diagnosed later,        PAST SURGICAL HISTORY:  Past Surgical History:   Procedure Laterality Date   • HYSTERECTOMY ROBOTIC XI  12/14/2018    Procedure: HYSTERECTOMY ROBOTIC XI;  Surgeon: Ab Trevino M.D.;  Location: SURGERY [facility];  Service: Gynecology   • SALPINGECTOMY Bilateral  12/14/2018    Procedure: SALPINGECTOMY;  Surgeon: Ab Trevino M.D.;  Location: SURGERY [facility];  Service: Gynecology   • OOPHORECTOMY Bilateral 12/14/2018    Procedure: OOPHORECTOMY;  Surgeon: Ab Trevino M.D.;  Location: SURGERY [facility];  Service: Gynecology   • NODE BIOPSY SENTINEL  12/14/2018    Procedure: NODE BIOPSY SENTINEL;  Surgeon: Ab Trevino M.D.;  Location: SURGERY [facility];  Service: Gynecology   • HYSTEROSCOPY WITH VIDEO OPERATIVE  9/28/2018    Procedure: HYSTEROSCOPY WITH VIDEO OPERATIVE;  Surgeon: Mickey Wray M.D.;  Location: SURGERY [facility];  Service: Gynecology   • DILATION AND CURETTAGE  9/28/2018    Procedure: DILATION AND CURETTAGE;  Surgeon: Mickey Wray M.D.;  Location: SURGERY [facility];  Service: Gynecology   • OTHER  2014    glaucoma surgery for right eye   • OTHER  2014    glaucoma surgery for left eye   • TRIGGER FINGER RELEASE  2013   • TONSILLECTOMY         CURRENT MEDICATIONS:  Current Outpatient Prescriptions   Medication Sig Dispense Refill   • oxyCODONE-acetaminophen (PERCOCET) 7.5-325 MG per tablet Take 1 Tab by mouth every four hours as needed.     • Crisaborole (EUCRISA) 2 % Ointment by Apply externally route.     • HydrOXYzine Pamoate (VISTARIL PO) Take  by mouth.     • clotrimazole-betamethasone (LOTRISONE) 1-0.05 % Cream Apply  to affected area(s) 2 times a day.     • triamterene-hydrochlorothiazide (MAXZIDE 75-50) 75-50 MG Tab Take 1 Tab by mouth every day.     • potassium chloride SA (KDUR) 20 MEQ Tab CR Take 20 mEq by mouth every day.     • metoprolol SR (TOPROL XL) 25 MG TABLET SR 24 HR Take 25 mg by mouth every day.     • latanoprost (XALATAN) 0.005 % Solution Place 1 Drop in both eyes every bedtime.     • aspirin EC (ECOTRIN) 81 MG Tablet Delayed Response Take 81 mg by mouth 2 Times a Day.     • ibuprofen (MOTRIN) 200 MG Tab Take 600 mg by mouth 1 time daily as needed.     • ondansetron (ZOFRAN ODT) 4 MG TABLET DISPERSIBLE  "Take 1 Tab by mouth every 6 hours as needed for Nausea. 10 Tab 0     No current facility-administered medications for this encounter.        ALLERGIES:    Kenalog    FAMILY HISTORY:    No family cancer history.    SOCIAL HISTORY:     reports that she has never smoked. She has never used smokeless tobacco. She reports that she drinks alcohol. She reports that she uses drugs.   Patient is , has no children [place] Patient is a retired .    REVIEW OF SYSTEMS:  Review of systems for today's date of service was reviewed and uploaded into the electronic medical record.     PAIN SCALE: 0-10  Pain Assessement: 5/10  Pain Location, Orientation and Scale: rt knee (torn meniscus)  What makes the pain better: rest  What makes the pain worse: ambulation    GYNECOLOGICAL STATUS:  : 1, Para: 0 and Number of Interrupted Pregnancies: 1    HORMONE USE:  Contraceptive hormone use 7 years and Post-menopause use 0 years    PHYSICAL EXAM:   ECOG PERFORMANCE STATUS:  1= Restricted in physically strenuous activity, but ambulatory and able to carry out work of a light sedentary nature, e.g., light housework, office work.  /89   Pulse 84   Temp 36.8 °C (98.2 °F)   Ht 1.499 m (4' 11\")   Wt 76.7 kg (169 lb)   SpO2 98%   BMI 34.13 kg/m²   GENERAL: Alert, oriented, obese, no acute distress  HEENT:  Pupils are equal, round, and reactive to light.  Extraocular muscles   are intact. Sclerae nonicteric.  Conjunctivae pink.  Oral cavity, tongue   protrudes midline.   NECK:  Supple without evidence of thyromegaly.  NODES:  No peripheral adenopathy of the neck, supraclavicular fossa or axillae   bilaterally.  LUNGS:  Clear to ascultation and resonant to percussion.  HEART:  Regular rate and rhythm.  No murmur appreciated  ABDOMEN:  Soft. No evidence of hepatosplenomegaly.  Positive bowel sounds.  EXTREMITIES:  Without Edema.  PELVIC: Normal introitus normal vaginal mucosa vaginal cuff intact " without visible or palpable abnormality.  3 gold fiducial markers inserted into the vaginal cuff to aid in placement of radiotherapy fields.  NEUROLOGIC:  Cranial nerves II through XII were intact.  Strength is 5/5 in   lower extremities bilaterally.  DTRs were symmetrical.  There was no focal   sensory deficit appreciated.        LABORATORY DATA:   Lab Results   Component Value Date/Time    SODIUM 138 12/14/2018 06:14 AM    POTASSIUM 3.3 (L) 12/14/2018 06:14 AM    CHLORIDE 99 12/14/2018 06:14 AM    CO2 28 12/14/2018 06:14 AM    GLUCOSE 95 12/14/2018 06:14 AM    BUN 13 12/14/2018 06:14 AM    CREATININE 0.71 12/14/2018 06:14 AM     Lab Results   Component Value Date/Time    ALKPHOSPHAT 72 12/14/2018 06:14 AM    ASTSGOT 32 12/14/2018 06:14 AM    ALTSGPT 53 (H) 12/14/2018 06:14 AM    TBILIRUBIN 0.7 12/14/2018 06:14 AM      Lab Results   Component Value Date/Time    WBC 8.3 12/14/2018 06:14 AM    RBC 4.68 12/14/2018 06:14 AM    HEMOGLOBIN 13.8 12/14/2018 06:14 AM    HEMATOCRIT 43.0 12/14/2018 06:14 AM    MCV 91.9 12/14/2018 06:14 AM    MCH 29.5 12/14/2018 06:14 AM    MCHC 32.1 (L) 12/14/2018 06:14 AM    MPV 9.6 12/14/2018 06:14 AM    NEUTSPOLYS 61.60 12/14/2018 06:14 AM    LYMPHOCYTES 28.70 12/14/2018 06:14 AM    MONOCYTES 7.40 12/14/2018 06:14 AM    EOSINOPHILS 1.30 12/14/2018 06:14 AM    BASOPHILS 0.60 12/14/2018 06:14 AM        RADIOLOGY DATA:  No results found.    IMPRESSION:    A 73 y.o. with stage IIb endometrioid adenocarcinoma grade 2 status post robotic assisted KYLE/BSO and sentinel node biopsy    RECOMMENDATIONS:   Reviewed pathology findings with patient and discussed results from the Portec 3 trial.  Adjuvant radiotherapy would be beneficial in treating not only the pelvis but the vaginal cuff region to minimize risk of local regional recurrence.  Radiotherapy will involve delivering 4500 cGy to the pelvis and boosting the vaginal cuff with brachytherapy 3 fractions of 600 cGy delivered to the surface.  The  technical aspects benefits risks associated with radiotherapy were reviewed.  She understands would like to proceed.  She will return for planning on January 17 with vaginal brachytherapy to start on the 18th and complete by January 24.  External beam will commence on January 28.    Thank you for the opportunity to participate in her care.  If any questions or comments, please do not hesitate in calling.    John MANCIA M.D.  Electronically signed by: John Nguyen V, 1/15/2019 12:37 PM  [phone]

## 2019-01-15 NOTE — NON-PROVIDER
"Patient was seen today in clinic with Dr. Nguyen for Endometrial Cancer.  Vitals signs and weight were obtained and pain assessment was completed.  Allergies and medications were reviewed with the patient.  Review of systems completed.     Vitals/Pain:  Vitals:    01/15/19 1027   BP: 102/89   Pulse: 84   Temp: 36.8 °C (98.2 °F)   SpO2: 98%   Weight: 76.7 kg (169 lb)   Height: 1.499 m (4' 11\")        PAIN SCALE: 0-10  Pain Assessement: 5/10  Pain Location, Orientation and Scale: rt knee (torn meniscus)  What makes the pain better: rest  What makes the pain worse: ambulation        Allergies:   Kenalog    Current Medications:  Current Outpatient Prescriptions   Medication Sig Dispense Refill   • oxyCODONE-acetaminophen (PERCOCET) 7.5-325 MG per tablet Take 1 Tab by mouth every four hours as needed.     • Crisaborole (EUCRISA) 2 % Ointment by Apply externally route.     • HydrOXYzine Pamoate (VISTARIL PO) Take  by mouth.     • clotrimazole-betamethasone (LOTRISONE) 1-0.05 % Cream Apply  to affected area(s) 2 times a day.     • triamterene-hydrochlorothiazide (MAXZIDE 75-50) 75-50 MG Tab Take 1 Tab by mouth every day.     • potassium chloride SA (KDUR) 20 MEQ Tab CR Take 20 mEq by mouth every day.     • metoprolol SR (TOPROL XL) 25 MG TABLET SR 24 HR Take 25 mg by mouth every day.     • latanoprost (XALATAN) 0.005 % Solution Place 1 Drop in both eyes every bedtime.     • aspirin EC (ECOTRIN) 81 MG Tablet Delayed Response Take 81 mg by mouth 2 Times a Day.     • ibuprofen (MOTRIN) 200 MG Tab Take 600 mg by mouth 1 time daily as needed.     • ondansetron (ZOFRAN ODT) 4 MG TABLET DISPERSIBLE Take 1 Tab by mouth every 6 hours as needed for Nausea. 10 Tab 0     No current facility-administered medications for this encounter.          PCP:  Mikhail Rousseau R.N.  "

## 2019-01-15 NOTE — PROGRESS NOTES
DX: Endometrial cancer    POC: HDR followed by external beam    BARRIERS ASSESSMENT:    TRANSPORTATION:  Pt has a car and drives but wants to hire a neighbor to drive her for treatment    FINANCIAL:  Pt not concerned about her oop expenses.  She is concerned about the cost of transportation.  She will check with her medicare secondary insurance has transportation/loding benefits. She will also check with [facility] and the [facility] .  [employer] [facility] application provided.     INSURANCE: Medicare and secondary     SUPPORT SYSTEM: Friends and neighbors     PSYCHOLOGICAL:  Distress screening done.  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  Re-introduced myself and role of oncology nurse navigator. Support & services offered.     Reviewed treatment plan. Education on side effects and side effect management.  Discussed fatigue and fatigue management tips.   Discussed nutrition and eating small meals more frequently as well as staying well hydrated. Pt to see oncology dietician upon starting treatment.      [facility]ided [facility].  Reviewed Resource guide with pt.  Provided healing touch flyer, Mind-body techniques flyer, and support group calender. Pt will review the information and let me know if interested in any of the programs.  Encouraged pt to visit cancer information and support. Will meet with pt soon to discuss plans for travel and finances.

## 2019-01-17 ENCOUNTER — HOSPITAL ENCOUNTER (OUTPATIENT)
Dept: RADIATION ONCOLOGY | Facility: MEDICAL CENTER | Age: 74
End: 2019-01-17

## 2019-01-17 PROCEDURE — 77290 THER RAD SIMULAJ FIELD CPLX: CPT | Mod: 26 | Performed by: RADIOLOGY

## 2019-01-17 PROCEDURE — 57156 INS VAG BRACHYTX DEVICE: CPT | Performed by: RADIOLOGY

## 2019-01-17 PROCEDURE — 77470 SPECIAL RADIATION TREATMENT: CPT | Mod: 26 | Performed by: RADIOLOGY

## 2019-01-17 PROCEDURE — 77263 THER RADIOLOGY TX PLNG CPLX: CPT | Performed by: RADIOLOGY

## 2019-01-17 PROCEDURE — 77332 RADIATION TREATMENT AID(S): CPT | Performed by: RADIOLOGY

## 2019-01-17 PROCEDURE — 77470 SPECIAL RADIATION TREATMENT: CPT | Performed by: RADIOLOGY

## 2019-01-17 PROCEDURE — 77290 THER RAD SIMULAJ FIELD CPLX: CPT | Performed by: RADIOLOGY

## 2019-01-17 PROCEDURE — 77334 RADIATION TREATMENT AID(S): CPT | Performed by: RADIOLOGY

## 2019-01-17 PROCEDURE — 77334 RADIATION TREATMENT AID(S): CPT | Mod: 26 | Performed by: RADIOLOGY

## 2019-01-18 PROCEDURE — 57156 INS VAG BRACHYTX DEVICE: CPT | Performed by: RADIOLOGY

## 2019-01-18 PROCEDURE — 77770 HDR RDNCL NTRSTL/ICAV BRCHTX: CPT | Mod: 26 | Performed by: RADIOLOGY

## 2019-01-18 PROCEDURE — 77295 3-D RADIOTHERAPY PLAN: CPT | Performed by: RADIOLOGY

## 2019-01-18 PROCEDURE — 77332 RADIATION TREATMENT AID(S): CPT | Performed by: RADIOLOGY

## 2019-01-18 PROCEDURE — C1717 BRACHYTX, NON-STR,HDR IR-192: HCPCS | Performed by: RADIOLOGY

## 2019-01-18 PROCEDURE — 77290 THER RAD SIMULAJ FIELD CPLX: CPT | Performed by: RADIOLOGY

## 2019-01-18 PROCEDURE — 77295 3-D RADIOTHERAPY PLAN: CPT | Mod: 26 | Performed by: RADIOLOGY

## 2019-01-18 PROCEDURE — 77770 HDR RDNCL NTRSTL/ICAV BRCHTX: CPT | Performed by: RADIOLOGY

## 2019-01-18 PROCEDURE — 77370 RADIATION PHYSICS CONSULT: CPT | Performed by: RADIOLOGY

## 2019-01-22 ENCOUNTER — HOSPITAL ENCOUNTER (OUTPATIENT)
Dept: RADIATION ONCOLOGY | Facility: MEDICAL CENTER | Age: 74
End: 2019-01-22

## 2019-01-22 PROCEDURE — 77770 HDR RDNCL NTRSTL/ICAV BRCHTX: CPT | Performed by: RADIOLOGY

## 2019-01-22 PROCEDURE — 77280 THER RAD SIMULAJ FIELD SMPL: CPT | Mod: 26 | Performed by: RADIOLOGY

## 2019-01-22 PROCEDURE — C1717 BRACHYTX, NON-STR,HDR IR-192: HCPCS | Performed by: RADIOLOGY

## 2019-01-22 PROCEDURE — 77332 RADIATION TREATMENT AID(S): CPT | Performed by: RADIOLOGY

## 2019-01-22 PROCEDURE — 77280 THER RAD SIMULAJ FIELD SMPL: CPT | Performed by: RADIOLOGY

## 2019-01-22 PROCEDURE — 57156 INS VAG BRACHYTX DEVICE: CPT | Performed by: RADIOLOGY

## 2019-01-22 PROCEDURE — 77770 HDR RDNCL NTRSTL/ICAV BRCHTX: CPT | Mod: 26 | Performed by: RADIOLOGY

## 2019-01-24 ENCOUNTER — HOSPITAL ENCOUNTER (OUTPATIENT)
Dept: RADIATION ONCOLOGY | Facility: MEDICAL CENTER | Age: 74
End: 2019-01-24

## 2019-01-24 PROCEDURE — 77301 RADIOTHERAPY DOSE PLAN IMRT: CPT | Performed by: RADIOLOGY

## 2019-01-24 PROCEDURE — 77280 THER RAD SIMULAJ FIELD SMPL: CPT | Performed by: RADIOLOGY

## 2019-01-24 PROCEDURE — 77300 RADIATION THERAPY DOSE PLAN: CPT | Performed by: RADIOLOGY

## 2019-01-24 PROCEDURE — 77770 HDR RDNCL NTRSTL/ICAV BRCHTX: CPT | Performed by: RADIOLOGY

## 2019-01-24 PROCEDURE — 77336 RADIATION PHYSICS CONSULT: CPT | Mod: XU | Performed by: RADIOLOGY

## 2019-01-24 PROCEDURE — 77338 DESIGN MLC DEVICE FOR IMRT: CPT | Mod: 26 | Performed by: RADIOLOGY

## 2019-01-24 PROCEDURE — 77338 DESIGN MLC DEVICE FOR IMRT: CPT | Mod: XU | Performed by: RADIOLOGY

## 2019-01-24 PROCEDURE — 77770 HDR RDNCL NTRSTL/ICAV BRCHTX: CPT | Mod: 26 | Performed by: RADIOLOGY

## 2019-01-24 PROCEDURE — 57156 INS VAG BRACHYTX DEVICE: CPT | Performed by: RADIOLOGY

## 2019-01-24 PROCEDURE — 77301 RADIOTHERAPY DOSE PLAN IMRT: CPT | Mod: 26 | Performed by: RADIOLOGY

## 2019-01-24 PROCEDURE — C1717 BRACHYTX, NON-STR,HDR IR-192: HCPCS | Performed by: RADIOLOGY

## 2019-01-24 PROCEDURE — 77332 RADIATION TREATMENT AID(S): CPT | Mod: XU | Performed by: RADIOLOGY

## 2019-01-28 ENCOUNTER — HOSPITAL ENCOUNTER (OUTPATIENT)
Dept: RADIATION ONCOLOGY | Facility: MEDICAL CENTER | Age: 74
End: 2019-01-28

## 2019-01-28 ENCOUNTER — DOCUMENTATION (OUTPATIENT)
Dept: HEMATOLOGY ONCOLOGY | Facility: MEDICAL CENTER | Age: 74
End: 2019-01-28

## 2019-01-28 PROCEDURE — 77280 THER RAD SIMULAJ FIELD SMPL: CPT | Performed by: RADIOLOGY

## 2019-01-28 PROCEDURE — 77386 HCHG IMRT DELIVERY COMPLEX: CPT | Performed by: RADIOLOGY

## 2019-01-29 ENCOUNTER — HOSPITAL ENCOUNTER (OUTPATIENT)
Dept: RADIATION ONCOLOGY | Facility: MEDICAL CENTER | Age: 74
End: 2019-01-29

## 2019-01-29 PROCEDURE — 77386 HCHG IMRT DELIVERY COMPLEX: CPT | Performed by: RADIOLOGY

## 2019-01-29 PROCEDURE — 77014 PR CT GUIDANCE PLACEMENT RAD THERAPY FIELDS: CPT | Mod: 26 | Performed by: RADIOLOGY

## 2019-01-30 PROCEDURE — 77014 PR CT GUIDANCE PLACEMENT RAD THERAPY FIELDS: CPT | Mod: 26 | Performed by: RADIOLOGY

## 2019-01-30 PROCEDURE — 77386 HCHG IMRT DELIVERY COMPLEX: CPT | Performed by: RADIOLOGY

## 2019-01-31 ENCOUNTER — HOSPITAL ENCOUNTER (OUTPATIENT)
Dept: RADIATION ONCOLOGY | Facility: MEDICAL CENTER | Age: 74
End: 2019-01-31

## 2019-01-31 PROCEDURE — 77014 PR CT GUIDANCE PLACEMENT RAD THERAPY FIELDS: CPT | Mod: 26 | Performed by: RADIOLOGY

## 2019-01-31 PROCEDURE — 77386 HCHG IMRT DELIVERY COMPLEX: CPT | Performed by: RADIOLOGY

## 2019-02-01 ENCOUNTER — HOSPITAL ENCOUNTER (OUTPATIENT)
Dept: RADIATION ONCOLOGY | Facility: MEDICAL CENTER | Age: 74
End: 2019-02-01

## 2019-02-01 ENCOUNTER — HOSPITAL ENCOUNTER (OUTPATIENT)
Dept: RADIATION ONCOLOGY | Facility: MEDICAL CENTER | Age: 74
End: 2019-02-28
Attending: RADIOLOGY
Payer: MEDICARE

## 2019-02-01 PROCEDURE — 77427 RADIATION TX MANAGEMENT X5: CPT | Performed by: RADIOLOGY

## 2019-02-01 PROCEDURE — 77386 HCHG IMRT DELIVERY COMPLEX: CPT | Performed by: RADIOLOGY

## 2019-02-01 PROCEDURE — 77336 RADIATION PHYSICS CONSULT: CPT | Performed by: RADIOLOGY

## 2019-02-01 PROCEDURE — 77014 PR CT GUIDANCE PLACEMENT RAD THERAPY FIELDS: CPT | Mod: 26 | Performed by: RADIOLOGY

## 2019-02-04 ENCOUNTER — HOSPITAL ENCOUNTER (OUTPATIENT)
Dept: RADIATION ONCOLOGY | Facility: MEDICAL CENTER | Age: 74
End: 2019-02-04

## 2019-02-04 PROCEDURE — 77014 PR CT GUIDANCE PLACEMENT RAD THERAPY FIELDS: CPT | Mod: 26 | Performed by: RADIOLOGY

## 2019-02-04 PROCEDURE — 77386 HCHG IMRT DELIVERY COMPLEX: CPT | Performed by: RADIOLOGY

## 2019-02-04 NOTE — PROGRESS NOTES
"Nutrition Services: RD Consultation/ New rADIATION Start  Met with pt following her initial radiation therapy  to assess current intake, appetite, and nutritional status. Pt appears well noutished.  Pt reports her appetite has been decreased x 3 weeks 2' to stress. She reports she is caring for her 87 y/o aunt at home. She denied any GI distress. Pt did not express any further nutrition-related questions or concerns at this time.     Assessment:  - Primary Dx: Endometrial Cancer  - Height: 4' 11\"   -   Weight: 169#   -   BMI: 34.2 (Obese class I)    Plan/Recommendations:  1. Provided and discussed handout regarding general nutrition guidelines as well as nutritional recommendations for patient's with Cancer, including tips/tricks should side effects of tx occur.   2. Discussed importance of PO intake/ nutrition with increased protein/kcal needs 2' to the hypermetabolic effects of cancer in order to maintain weight and preserve lean body mass.   3. Provided and went over food lists including healthy snack ideas as well as foods high in protein.   4. Encouraged Hydration      Pt reports understanding and was receptive. RD following and to make further recommendations as indicated.     "

## 2019-02-05 ENCOUNTER — HOSPITAL ENCOUNTER (OUTPATIENT)
Dept: RADIATION ONCOLOGY | Facility: MEDICAL CENTER | Age: 74
End: 2019-02-05

## 2019-02-05 PROCEDURE — 77386 HCHG IMRT DELIVERY COMPLEX: CPT | Performed by: RADIOLOGY

## 2019-02-05 PROCEDURE — 77014 PR CT GUIDANCE PLACEMENT RAD THERAPY FIELDS: CPT | Mod: 26 | Performed by: RADIOLOGY

## 2019-02-06 PROCEDURE — 77014 PR CT GUIDANCE PLACEMENT RAD THERAPY FIELDS: CPT | Mod: 26 | Performed by: RADIOLOGY

## 2019-02-06 PROCEDURE — 77386 HCHG IMRT DELIVERY COMPLEX: CPT | Performed by: RADIOLOGY

## 2019-02-07 ENCOUNTER — HOSPITAL ENCOUNTER (OUTPATIENT)
Dept: RADIATION ONCOLOGY | Facility: MEDICAL CENTER | Age: 74
End: 2019-02-07

## 2019-02-07 PROCEDURE — 77014 PR CT GUIDANCE PLACEMENT RAD THERAPY FIELDS: CPT | Mod: 26 | Performed by: RADIOLOGY

## 2019-02-07 PROCEDURE — 77386 HCHG IMRT DELIVERY COMPLEX: CPT | Performed by: RADIOLOGY

## 2019-02-08 ENCOUNTER — HOSPITAL ENCOUNTER (OUTPATIENT)
Dept: RADIATION ONCOLOGY | Facility: MEDICAL CENTER | Age: 74
End: 2019-02-08

## 2019-02-08 PROCEDURE — 77014 PR CT GUIDANCE PLACEMENT RAD THERAPY FIELDS: CPT | Mod: 26 | Performed by: RADIOLOGY

## 2019-02-08 PROCEDURE — 77336 RADIATION PHYSICS CONSULT: CPT | Performed by: RADIOLOGY

## 2019-02-08 PROCEDURE — 77386 HCHG IMRT DELIVERY COMPLEX: CPT | Performed by: RADIOLOGY

## 2019-02-08 PROCEDURE — 77427 RADIATION TX MANAGEMENT X5: CPT | Performed by: RADIOLOGY

## 2019-02-11 ENCOUNTER — HOSPITAL ENCOUNTER (OUTPATIENT)
Dept: RADIATION ONCOLOGY | Facility: MEDICAL CENTER | Age: 74
End: 2019-02-11

## 2019-02-11 PROCEDURE — 77386 HCHG IMRT DELIVERY COMPLEX: CPT | Performed by: RADIOLOGY

## 2019-02-11 PROCEDURE — 77014 PR CT GUIDANCE PLACEMENT RAD THERAPY FIELDS: CPT | Mod: 26 | Performed by: RADIOLOGY

## 2019-02-12 ENCOUNTER — HOSPITAL ENCOUNTER (OUTPATIENT)
Dept: RADIATION ONCOLOGY | Facility: MEDICAL CENTER | Age: 74
End: 2019-02-12

## 2019-02-12 PROCEDURE — 77014 PR CT GUIDANCE PLACEMENT RAD THERAPY FIELDS: CPT | Mod: 26 | Performed by: RADIOLOGY

## 2019-02-12 PROCEDURE — 77386 HCHG IMRT DELIVERY COMPLEX: CPT | Performed by: RADIOLOGY

## 2019-02-13 ENCOUNTER — DOCUMENTATION (OUTPATIENT)
Dept: HEMATOLOGY ONCOLOGY | Facility: MEDICAL CENTER | Age: 74
End: 2019-02-13

## 2019-02-13 PROCEDURE — 77014 PR CT GUIDANCE PLACEMENT RAD THERAPY FIELDS: CPT | Mod: 26 | Performed by: RADIOLOGY

## 2019-02-13 PROCEDURE — 77386 HCHG IMRT DELIVERY COMPLEX: CPT | Performed by: RADIOLOGY

## 2019-02-14 ENCOUNTER — HOSPITAL ENCOUNTER (OUTPATIENT)
Dept: RADIATION ONCOLOGY | Facility: MEDICAL CENTER | Age: 74
End: 2019-02-14

## 2019-02-14 PROCEDURE — 77386 HCHG IMRT DELIVERY COMPLEX: CPT | Performed by: RADIOLOGY

## 2019-02-14 PROCEDURE — 77014 PR CT GUIDANCE PLACEMENT RAD THERAPY FIELDS: CPT | Mod: 26 | Performed by: RADIOLOGY

## 2019-02-15 ENCOUNTER — HOSPITAL ENCOUNTER (OUTPATIENT)
Dept: RADIATION ONCOLOGY | Facility: MEDICAL CENTER | Age: 74
End: 2019-02-15

## 2019-02-15 PROCEDURE — 77336 RADIATION PHYSICS CONSULT: CPT | Performed by: RADIOLOGY

## 2019-02-15 PROCEDURE — 77427 RADIATION TX MANAGEMENT X5: CPT | Performed by: RADIOLOGY

## 2019-02-15 PROCEDURE — 77014 PR CT GUIDANCE PLACEMENT RAD THERAPY FIELDS: CPT | Mod: 26 | Performed by: RADIOLOGY

## 2019-02-15 PROCEDURE — 77386 HCHG IMRT DELIVERY COMPLEX: CPT | Performed by: RADIOLOGY

## 2019-02-19 ENCOUNTER — HOSPITAL ENCOUNTER (OUTPATIENT)
Dept: RADIATION ONCOLOGY | Facility: MEDICAL CENTER | Age: 74
End: 2019-02-19

## 2019-02-19 PROCEDURE — 77386 HCHG IMRT DELIVERY COMPLEX: CPT | Performed by: RADIOLOGY

## 2019-02-19 PROCEDURE — 77014 PR CT GUIDANCE PLACEMENT RAD THERAPY FIELDS: CPT | Mod: 26 | Performed by: RADIOLOGY

## 2019-02-20 PROCEDURE — 77386 HCHG IMRT DELIVERY COMPLEX: CPT | Performed by: RADIOLOGY

## 2019-02-20 PROCEDURE — 77014 PR CT GUIDANCE PLACEMENT RAD THERAPY FIELDS: CPT | Mod: 26 | Performed by: RADIOLOGY

## 2019-02-21 ENCOUNTER — HOSPITAL ENCOUNTER (OUTPATIENT)
Dept: RADIATION ONCOLOGY | Facility: MEDICAL CENTER | Age: 74
End: 2019-02-21

## 2019-02-21 PROCEDURE — 77014 PR CT GUIDANCE PLACEMENT RAD THERAPY FIELDS: CPT | Mod: 26 | Performed by: RADIOLOGY

## 2019-02-21 PROCEDURE — 77386 HCHG IMRT DELIVERY COMPLEX: CPT | Performed by: RADIOLOGY

## 2019-02-22 ENCOUNTER — HOSPITAL ENCOUNTER (OUTPATIENT)
Dept: RADIATION ONCOLOGY | Facility: MEDICAL CENTER | Age: 74
End: 2019-02-22

## 2019-02-22 PROCEDURE — 77386 HCHG IMRT DELIVERY COMPLEX: CPT | Performed by: RADIOLOGY

## 2019-02-22 PROCEDURE — 77014 PR CT GUIDANCE PLACEMENT RAD THERAPY FIELDS: CPT | Mod: 26 | Performed by: RADIOLOGY

## 2019-02-22 NOTE — PROGRESS NOTES
Nutrition Services: Update  Met with pt following radiation therapy to follow-up on current nutrition status. Weight stable at 168#. Pt reports she has nausea in afternoons while cooking dinner, she has not yet taken PRN anti-emetics. Encouraged her to take them 30 min- 1 hour prior to dinners. Also discussed nutrition tips to help alleviate nausea. She reports good intake and no further GI distress.     RD following

## 2019-02-25 ENCOUNTER — HOSPITAL ENCOUNTER (OUTPATIENT)
Dept: RADIATION ONCOLOGY | Facility: MEDICAL CENTER | Age: 74
End: 2019-02-25

## 2019-02-25 PROCEDURE — 77427 RADIATION TX MANAGEMENT X5: CPT | Performed by: RADIOLOGY

## 2019-02-25 PROCEDURE — 77014 PR CT GUIDANCE PLACEMENT RAD THERAPY FIELDS: CPT | Mod: 26 | Performed by: RADIOLOGY

## 2019-02-25 PROCEDURE — 77386 HCHG IMRT DELIVERY COMPLEX: CPT | Performed by: RADIOLOGY

## 2019-02-25 PROCEDURE — 77336 RADIATION PHYSICS CONSULT: CPT | Performed by: RADIOLOGY

## 2019-02-26 ENCOUNTER — HOSPITAL ENCOUNTER (OUTPATIENT)
Dept: RADIATION ONCOLOGY | Facility: MEDICAL CENTER | Age: 74
End: 2019-02-26

## 2019-02-26 ENCOUNTER — PATIENT OUTREACH (OUTPATIENT)
Dept: OTHER | Facility: MEDICAL CENTER | Age: 74
End: 2019-02-26

## 2019-02-26 PROCEDURE — 77014 PR CT GUIDANCE PLACEMENT RAD THERAPY FIELDS: CPT | Mod: 26 | Performed by: RADIOLOGY

## 2019-02-26 PROCEDURE — 77386 HCHG IMRT DELIVERY COMPLEX: CPT | Performed by: RADIOLOGY

## 2019-02-27 ENCOUNTER — DOCUMENTATION (OUTPATIENT)
Dept: HEMATOLOGY ONCOLOGY | Facility: MEDICAL CENTER | Age: 74
End: 2019-02-27

## 2019-02-27 PROCEDURE — 77386 HCHG IMRT DELIVERY COMPLEX: CPT | Performed by: RADIOLOGY

## 2019-02-27 PROCEDURE — 77014 PR CT GUIDANCE PLACEMENT RAD THERAPY FIELDS: CPT | Mod: 26 | Performed by: RADIOLOGY

## 2019-02-27 NOTE — PROGRESS NOTES
Social Work intern Amparo HUTCHINS Called patient on 2/26/2019 for a follow-up and to provide appropriate resources if needed. Patient did not answer and Amparo left a voicemail with a call back number ([phone]) in the case that the patient needs any assistance or resources.

## 2019-02-28 ENCOUNTER — HOSPITAL ENCOUNTER (OUTPATIENT)
Dept: RADIATION ONCOLOGY | Facility: MEDICAL CENTER | Age: 74
End: 2019-02-28

## 2019-02-28 PROCEDURE — 77386 HCHG IMRT DELIVERY COMPLEX: CPT | Performed by: RADIOLOGY

## 2019-02-28 PROCEDURE — 77014 PR CT GUIDANCE PLACEMENT RAD THERAPY FIELDS: CPT | Mod: 26 | Performed by: RADIOLOGY

## 2019-02-28 NOTE — PROGRESS NOTES
Nutrition Services: Update  Met with pt following radiation therapy to follow-up on current nutrition status. Note she has only 3 treatments remaining. Patient reports continued good  intake and appetite. Weight remains stable, 172#. Patient reports she is having regular BM's with occasional Imodium PRN. She denied any nutrition-related questions or concerns. Per pt nutritionally stable, RD to sign off. Pt has Oncology RD contact information should further questions or concerns arise, RD available PRN [phone].

## 2019-03-01 ENCOUNTER — HOSPITAL ENCOUNTER (OUTPATIENT)
Dept: RADIATION ONCOLOGY | Facility: MEDICAL CENTER | Age: 74
End: 2019-03-31
Attending: RADIOLOGY
Payer: MEDICARE

## 2019-03-01 ENCOUNTER — HOSPITAL ENCOUNTER (OUTPATIENT)
Dept: RADIATION ONCOLOGY | Facility: MEDICAL CENTER | Age: 74
End: 2019-03-01

## 2019-03-01 PROCEDURE — 77386 HCHG IMRT DELIVERY COMPLEX: CPT | Performed by: RADIOLOGY

## 2019-03-01 PROCEDURE — 77014 PR CT GUIDANCE PLACEMENT RAD THERAPY FIELDS: CPT | Mod: 26 | Performed by: RADIOLOGY

## 2019-03-04 ENCOUNTER — HOSPITAL ENCOUNTER (OUTPATIENT)
Dept: RADIATION ONCOLOGY | Facility: MEDICAL CENTER | Age: 74
End: 2019-03-04

## 2019-03-04 PROCEDURE — 77014 PR CT GUIDANCE PLACEMENT RAD THERAPY FIELDS: CPT | Mod: 26 | Performed by: RADIOLOGY

## 2019-03-04 PROCEDURE — 77386 HCHG IMRT DELIVERY COMPLEX: CPT | Performed by: RADIOLOGY

## 2019-03-04 PROCEDURE — 77427 RADIATION TX MANAGEMENT X5: CPT | Performed by: RADIOLOGY

## 2019-03-04 PROCEDURE — 77336 RADIATION PHYSICS CONSULT: CPT | Performed by: RADIOLOGY

## 2019-04-11 ENCOUNTER — HOSPITAL ENCOUNTER (OUTPATIENT)
Dept: LAB | Facility: MEDICAL CENTER | Age: 74
End: 2019-04-11
Attending: SPECIALIST
Payer: MEDICARE

## 2019-04-11 PROCEDURE — 87624 HPV HI-RISK TYP POOLED RSLT: CPT

## 2019-04-11 PROCEDURE — 88175 CYTOPATH C/V AUTO FLUID REDO: CPT

## 2019-04-15 LAB
CYTOLOGY REG CYTOL: NORMAL
HPV HR 12 DNA CVX QL NAA+PROBE: NEGATIVE
HPV16 DNA SPEC QL NAA+PROBE: NEGATIVE
HPV18 DNA SPEC QL NAA+PROBE: NEGATIVE
SPECIMEN SOURCE: NORMAL

## 2019-04-26 ENCOUNTER — PATIENT OUTREACH (OUTPATIENT)
Dept: OTHER | Facility: MEDICAL CENTER | Age: 74
End: 2019-04-26

## 2019-04-26 ENCOUNTER — HOSPITAL ENCOUNTER (OUTPATIENT)
Dept: RADIATION ONCOLOGY | Facility: MEDICAL CENTER | Age: 74
End: 2019-04-30
Attending: RADIOLOGY
Payer: MEDICARE

## 2019-04-26 VITALS
DIASTOLIC BLOOD PRESSURE: 73 MMHG | BODY MASS INDEX: 33.73 KG/M2 | WEIGHT: 167 LBS | TEMPERATURE: 98 F | SYSTOLIC BLOOD PRESSURE: 148 MMHG | HEART RATE: 73 BPM | OXYGEN SATURATION: 96 %

## 2019-04-26 DIAGNOSIS — C54.1 ENDOMETRIAL CANCER (HCC): ICD-10-CM

## 2019-04-26 PROCEDURE — 99212 OFFICE O/P EST SF 10 MIN: CPT | Performed by: RADIOLOGY

## 2019-04-26 ASSESSMENT — PAIN SCALES - GENERAL: PAINLEVEL: NO PAIN

## 2019-04-26 NOTE — NON-PROVIDER
Patient was seen today in clinic with Dr. Nguyen for follow up.  Vitals signs and weight were obtained and pain assessment was completed.  Allergies and medications were reviewed with the patient.  Review of systems completed.     Vitals/Pain:  Vitals:    04/26/19 1250   BP: 148/73   Pulse: 73   Temp: 36.7 °C (98 °F)   SpO2: 96%   Weight: 75.8 kg (167 lb)   Pain Score: No pain        Allergies:   Kenalog    Current Medications:  Current Outpatient Prescriptions   Medication Sig Dispense Refill   • triamterene-hydrochlorothiazide (MAXZIDE 75-50) 75-50 MG Tab Take 1 Tab by mouth every day.     • potassium chloride SA (KDUR) 20 MEQ Tab CR Take 20 mEq by mouth every day.     • metoprolol SR (TOPROL XL) 25 MG TABLET SR 24 HR Take 25 mg by mouth every day.     • latanoprost (XALATAN) 0.005 % Solution Place 1 Drop in both eyes every bedtime.     • aspirin EC (ECOTRIN) 81 MG Tablet Delayed Response Take 81 mg by mouth 2 Times a Day.     • oxyCODONE-acetaminophen (PERCOCET) 7.5-325 MG per tablet Take 1 Tab by mouth every four hours as needed.     • Crisaborole (EUCRISA) 2 % Ointment by Apply externally route.     • HydrOXYzine Pamoate (VISTARIL PO) Take  by mouth.     • clotrimazole-betamethasone (LOTRISONE) 1-0.05 % Cream Apply  to affected area(s) 2 times a day.     • ibuprofen (MOTRIN) 200 MG Tab Take 600 mg by mouth 1 time daily as needed.     • ondansetron (ZOFRAN ODT) 4 MG TABLET DISPERSIBLE Take 1 Tab by mouth every 6 hours as needed for Nausea. (Patient not taking: Reported on 4/26/2019) 10 Tab 0     No current facility-administered medications for this encounter.          PCP:  Mikhail Saucedo, Med Ass't

## 2019-04-26 NOTE — PROGRESS NOTES
RADIATION ONCOLOGY FOLLOW-UP    DATE OF SERVICE: 4/26/2019    IDENTIFICATION:   A 73 y.o. female with Malignant neoplasm of endometrium, Stg II, C54.1 - Malignant neoplasm of endometrium    Cell Histology Category: Endometrioid; Type: Endometrioid adenocarcinoma, NOS; Grade II - Moderately differentiated       PRESCRIPTION:  Course ID Plan ID Rx Dose (cGy) Fraction Status   C1_Pelvis & VBT [id number]_30_00 1,800 3 / 3 Treatment Approved   C1_Pelvis & VBT Pelvis 4,500 25 / 25 Treatment Approved   Imaging Imaging 1 0 / 1 Treatment Approved       TREATMENT SUMMARY:    Course: C1_Pelvis & VBT    Treatment Site Ref. ID Energy Dose/Fx (cGy) #Fx Dose Correction (cGy) Total Dose (cGy) Start Date End Date Elapsed Days   Cyl_30_00 Cylinder_30  600 3 / 3 0 1,800 1/18/2019 1/24/2019 6   Pelvis Pelvis 6X 180 25 / 25 0 4,500 1/28/2019 3/4/2019 35     .      HISTORY OF PRESENT ILLNESS:   Initial follow-up visit after completion of brachytherapy and pelvic radiotherapy for stage II endometrial cancer.  Overall, she is doing well.  She states her bowels are just about back to normal.  She denies any urinary complaints.  She denies vaginal bleeding or discharge.  She is using her vaginal dilator.    CURRENT MEDICATIONS:  Current Outpatient Prescriptions   Medication Sig Dispense Refill   • triamterene-hydrochlorothiazide (MAXZIDE 75-50) 75-50 MG Tab Take 1 Tab by mouth every day.     • potassium chloride SA (KDUR) 20 MEQ Tab CR Take 20 mEq by mouth every day.     • metoprolol SR (TOPROL XL) 25 MG TABLET SR 24 HR Take 25 mg by mouth every day.     • latanoprost (XALATAN) 0.005 % Solution Place 1 Drop in both eyes every bedtime.     • aspirin EC (ECOTRIN) 81 MG Tablet Delayed Response Take 81 mg by mouth 2 Times a Day.     • oxyCODONE-acetaminophen (PERCOCET) 7.5-325 MG per tablet Take 1 Tab by mouth every four hours as needed.     • Crisaborole (EUCRISA) 2 % Ointment by Apply externally route.     • HydrOXYzine Pamoate (VISTARIL PO) Take  by  mouth.     • clotrimazole-betamethasone (LOTRISONE) 1-0.05 % Cream Apply  to affected area(s) 2 times a day.     • ibuprofen (MOTRIN) 200 MG Tab Take 600 mg by mouth 1 time daily as needed.     • ondansetron (ZOFRAN ODT) 4 MG TABLET DISPERSIBLE Take 1 Tab by mouth every 6 hours as needed for Nausea. (Patient not taking: Reported on 4/26/2019) 10 Tab 0     No current facility-administered medications for this encounter.        ALLERGIES:  Kenalog    REVIEW OF SYSTEMS:  A review of systems for today's date of service was reviewed and uploaded into the electronic medical record.    PHYSICAL EXAM:   /73   Pulse 73   Temp 36.7 °C (98 °F)   Wt 75.8 kg (167 lb)   SpO2 96%   BMI 33.73 kg/m²   GENERAL: Alert, oriented, no acute distress  HEENT:  Pupils are equal, round, and reactive to light.  Extraocular muscles   are intact. Sclerae nonicteric.  Conjunctivae pink.  Oral cavity, tongue   protrudes midline.   NODES:  No peripheral adenopathy of the neck, supraclavicular fossa or axillae   bilaterally.  LUNGS:  Clear to ascultation and resonant to percussion.  HEART:  Regular rate and rhythm.  No murmur appreciated  ABDOMEN:  Soft. No evidence of hepatosplenomegaly.  Positive bowel sounds.  EXTREMITIES:  Without Edema.  PELVIC: Normal introitus normal vaginal mucosa.  Vaginal cuff intact.  No visible or palpable abnormality.      LABORATORY DATA:   Lab Results   Component Value Date/Time    SODIUM 138 12/14/2018 06:14 AM    POTASSIUM 3.3 (L) 12/14/2018 06:14 AM    CHLORIDE 99 12/14/2018 06:14 AM    CO2 28 12/14/2018 06:14 AM    GLUCOSE 95 12/14/2018 06:14 AM    BUN 13 12/14/2018 06:14 AM    CREATININE 0.71 12/14/2018 06:14 AM     Lab Results   Component Value Date/Time    ALKPHOSPHAT 72 12/14/2018 06:14 AM    ASTSGOT 32 12/14/2018 06:14 AM    ALTSGPT 53 (H) 12/14/2018 06:14 AM    TBILIRUBIN 0.7 12/14/2018 06:14 AM      Lab Results   Component Value Date/Time    WBC 8.3 12/14/2018 06:14 AM    RBC 4.68 12/14/2018  06:14 AM    HEMOGLOBIN 13.8 12/14/2018 06:14 AM    HEMATOCRIT 43.0 12/14/2018 06:14 AM    MCV 91.9 12/14/2018 06:14 AM    MCH 29.5 12/14/2018 06:14 AM    MCHC 32.1 (L) 12/14/2018 06:14 AM    MPV 9.6 12/14/2018 06:14 AM    NEUTSPOLYS 61.60 12/14/2018 06:14 AM    LYMPHOCYTES 28.70 12/14/2018 06:14 AM    MONOCYTES 7.40 12/14/2018 06:14 AM    EOSINOPHILS 1.30 12/14/2018 06:14 AM    BASOPHILS 0.60 12/14/2018 06:14 AM        RADIOLOGY DATA:  No results found.    IMPRESSION:    A 73 y.o. with stage II endometrial cancer status post surgery followed by adjuvant radiotherapy.  Clinical without evidence of disease.    RECOMMENDATIONS:   Recommended continuing use vaginal dilator to prevent vaginal adhesions.  At this point will turn over routine follow-up care to Dr. Trevino and see patient on as-needed basis.    Thank you for the opportunity to participate in her care.  If any questions or comments, please do not hesitate in calling.    John MANCIA M.D.  Electronically signed by: John Nguyen V, 4/26/2019 1:32 PM  [phone]

## 2019-04-29 ENCOUNTER — PATIENT OUTREACH (OUTPATIENT)
Dept: OTHER | Facility: MEDICAL CENTER | Age: 74
End: 2019-04-29

## 2019-04-29 NOTE — PROGRESS NOTES
Late entry for 4/26/19.  Met with pt after her radiation follow up appointment.  Discussed Survivorship Care plan and treatment summary and that the plan would be mailed to her and would receive a follow up phone call to confirm receipt and discuss the contents.  Pt expressed she was happy to know that she would be getting the document.

## 2019-05-20 ENCOUNTER — HOSPITAL ENCOUNTER (OUTPATIENT)
Dept: RADIOLOGY | Facility: MEDICAL CENTER | Age: 74
End: 2019-05-20
Attending: SPECIALIST
Payer: MEDICARE

## 2019-05-20 DIAGNOSIS — C54.9 MALIGNANT NEOPLASM OF CORPUS UTERI (HCC): ICD-10-CM

## 2019-05-20 PROCEDURE — 700117 HCHG RX CONTRAST REV CODE 255: Performed by: SPECIALIST

## 2019-05-20 PROCEDURE — 74177 CT ABD & PELVIS W/CONTRAST: CPT

## 2019-05-20 RX ADMIN — IOHEXOL 100 ML: 350 INJECTION, SOLUTION INTRAVENOUS at 13:55

## 2019-05-20 RX ADMIN — IOHEXOL 50 ML: 240 INJECTION, SOLUTION INTRATHECAL; INTRAVASCULAR; INTRAVENOUS; ORAL at 13:55

## 2019-08-02 ENCOUNTER — TELEPHONE (OUTPATIENT)
Dept: HEMATOLOGY ONCOLOGY | Facility: MEDICAL CENTER | Age: 74
End: 2019-08-02

## 2019-08-02 NOTE — TELEPHONE ENCOUNTER
1st attempt to contact the patient- Left voicemail for patient requesting a return call to schedule New Oncology Genetic appointment. (Remind patient to bring a list/ or questionnaire, of her cancer related family history)  NP/Kirsten/endometrial cancer/Dr Treivno

## 2019-08-11 NOTE — PROGRESS NOTES
"  Non face to face prolonged services of clinical data and chart information reviewed for a total time of 30 minutes performed on 8/11 for [place] Jerica Ken    Reviewed were:    Referral    Previous encounters    Medications    Imaging    Previous procedures    Other Orders    Letters    Notes    Media    Miscellaneous reports    Patient summaries        Counseling, testing information and materials prepared    \"I spent 30 minutes  from 0635 to 0705 reviewing past records, prior to visit pertaining to genetic risk.\"     Dale Rodriguez M.D.  Edited  "

## 2019-08-12 ENCOUNTER — OFFICE VISIT (OUTPATIENT)
Dept: HEMATOLOGY ONCOLOGY | Facility: MEDICAL CENTER | Age: 74
End: 2019-08-12
Payer: MEDICARE

## 2019-08-12 ENCOUNTER — NON-PROVIDER VISIT (OUTPATIENT)
Dept: HEMATOLOGY ONCOLOGY | Facility: MEDICAL CENTER | Age: 74
End: 2019-08-12
Payer: MEDICARE

## 2019-08-12 VITALS
TEMPERATURE: 98.1 F | HEART RATE: 77 BPM | DIASTOLIC BLOOD PRESSURE: 76 MMHG | WEIGHT: 169.42 LBS | RESPIRATION RATE: 16 BRPM | OXYGEN SATURATION: 96 % | HEIGHT: 59 IN | SYSTOLIC BLOOD PRESSURE: 114 MMHG | BODY MASS INDEX: 34.16 KG/M2

## 2019-08-12 VITALS
OXYGEN SATURATION: 96 % | WEIGHT: 169 LBS | DIASTOLIC BLOOD PRESSURE: 76 MMHG | HEART RATE: 77 BPM | HEIGHT: 59 IN | BODY MASS INDEX: 34.07 KG/M2 | TEMPERATURE: 98.1 F | SYSTOLIC BLOOD PRESSURE: 114 MMHG | RESPIRATION RATE: 16 BRPM

## 2019-08-12 DIAGNOSIS — Z80.8 FAMILY HISTORY OF OTHER SPECIFIED MALIGNANT NEOPLASM: ICD-10-CM

## 2019-08-12 DIAGNOSIS — C54.1 ENDOMETRIAL SARCOMA (HCC): ICD-10-CM

## 2019-08-12 DIAGNOSIS — Z80.8 FAMILY HISTORY OF MELANOMA: ICD-10-CM

## 2019-08-12 DIAGNOSIS — Z80.8 FAMILY HISTORY OF BRAIN CANCER: ICD-10-CM

## 2019-08-12 DIAGNOSIS — C54.1 ENDOMETRIAL CANCER (HCC): ICD-10-CM

## 2019-08-12 PROCEDURE — 36415 COLL VENOUS BLD VENIPUNCTURE: CPT | Performed by: MEDICAL GENETICS

## 2019-08-12 PROCEDURE — 99203 OFFICE O/P NEW LOW 30 MIN: CPT | Performed by: MEDICAL GENETICS

## 2019-08-12 ASSESSMENT — PAIN SCALES - GENERAL
PAINLEVEL: NO PAIN
PAINLEVEL: NO PAIN

## 2019-08-12 NOTE — PROGRESS NOTES
GENETIC RISK ASSESSMENT    Tamika Ken is a 73 y.o. year old patient who was referred by Uriel for cancer genetic risk assessment.    Chief Complaint: endometrial cancer, family history of melanoma and brain cancer    HPI: The patient was well until 1 year ago at which time a suspicious physical exam caused the patient to be referred for imaging .  A diagnosis of endometrial carcinoma was made. The patient underwent surgery, chemo and radiation therapy  Review of personal and family histories suggested that a cancer genetic risk assessment was in order.    Review of Systems (ROS):  Constitutional normaln  Eyes noraml  ENT normal   Respiratory normal  Cardiovascular normal  Gastrointestinal normal  Genitourinary normal  Musculoskeletal normal  Skin normal  Neurological normal  Endocrine normal  Psychiatric normal  Hematologic/lymphatic normal  Allergic/Immunologic none  All other systems reviewed and are negative.    History (Past/Family)  Family History:   No family history on file.   3 generation pedigree built   Yes   Alexandrea empiric risk calculation No   Summerfield II empiric risk calculation  No    Social History:       Social History     Socioeconomic History   • Marital status:      Spouse name: Not on file   • Number of children: Not on file   • Years of education: Not on file   • Highest education level: Not on file   Occupational History   • Occupation: retired    Social Needs   • Financial resource strain: Not on file   • Food insecurity:     Worry: Not on file     Inability: Not on file   • Transportation needs:     Medical: Not on file     Non-medical: Not on file   Tobacco Use   • Smoking status: Never Smoker   • Smokeless tobacco: Never Used   Substance and Sexual Activity   • Alcohol use: Yes     Comment: 1-2 a week   • Drug use: Yes     Comment: medical marijuana, sublinguinal, last use 1 week ago   • Sexual activity: Not on file   Lifestyle   • Physical activity:     Days per  week: Not on file     Minutes per session: Not on file   • Stress: Not on file   Relationships   • Social connections:     Talks on phone: Not on file     Gets together: Not on file     Attends Congregational service: Not on file     Active member of club or organization: Not on file     Attends meetings of clubs or organizations: Not on file     Relationship status: Not on file   • Intimate partner violence:     Fear of current or ex partner: Not on file     Emotionally abused: Not on file     Physically abused: Not on file     Forced sexual activity: Not on file   Other Topics Concern   • Not on file   Social History Narrative   • Not on file       Patient Past History:  Past Medical History:   Diagnosis Date   • Arthritis     back, hips, fingers   • Asthma    • Breath shortness     feels congested and short of breath easily   • Cancer (HCC)     endometrial cancer   • Eczema    • Glaucoma    • Gynecological disorder     irrgular postmenopausal bleeding   • Hypertension    • Polio     7 years old, joints are more fragile   • Stroke (HCC) 2014    no deficits, diagnosed later,            NCCN Testing Criteria Met                            Yes    Physical Exam  Constitutional    Wt 76.9 kg (169 lb 6.8 oz)   BMI 34.22 kg/m²         General appearance: normal   Head Circumference:   ([place])  Eyes    Conjunctiva: clear   Pupils: reactive     ENMT    External inspection: normal   Assessment of Hearing: normal   Lips/cheeks/gums: no lesions  Neck   External exam: normal   Thyroid: no masses  Respiratory   Auscultation: clera    Cardiovascular   Auscultation: RRR   Edema : none  Chest   Breasts (exam): not done   Palpation:   GI   External exam and palpation: normal     Pelvic (female): not done   External exam (male): normal  Lymphatic   Palpation in 2 areas: normal    Musculoskeletal   Gait: normal   Digits and Nails: no lesions  Skin   Inspection: normal   Palpation: no masses                  Fibrofolliculoma: absent                   Trichodiscoma: absent                   Akrochordon: absent                   CAfe-au-lait:  absent                  Hypopigmentation: absent                  Mucosal freckling:  absent  Neurologic   Cranial nerves: intac5   DTR’s: 2+       Assessment and Plan:  Patient with recent diagnosis of endometrial cancer and family history of brain and melanoma cancers    I spent a total of 40 minutes of face to face time with >50% of time spent in counseling and coordination of care discussing matters stated in above note.    Ambry panel ordered      Dale Rodriguez M.D.

## 2019-08-12 NOTE — NON-PROVIDER
Tamika Ken is a 73 y.o. female here for a non-provider visit for: Lab Draws  on 8/12/2019 at 11:42 AM    Procedure Performed: Venipuncture     Anatomical site: Left Antecubital Area (AC)    Equipment used: 21g vacutainer    Labs drawn: 115 network disks (two lavender EDTA tubes)    Ordering Provider: {Dr. Dale Rodriguez    Toro By: Dyan Lin, Med Ass't

## 2022-05-25 ENCOUNTER — APPOINTMENT (OUTPATIENT)
Dept: RHEUMATOLOGY | Facility: MEDICAL CENTER | Age: 77
End: 2022-05-25
Payer: MEDICARE

## 2022-06-30 ENCOUNTER — OFFICE VISIT (OUTPATIENT)
Dept: RHEUMATOLOGY | Facility: MEDICAL CENTER | Age: 77
End: 2022-06-30
Payer: MEDICARE

## 2022-06-30 ENCOUNTER — HOSPITAL ENCOUNTER (OUTPATIENT)
Dept: LAB | Facility: MEDICAL CENTER | Age: 77
End: 2022-06-30
Attending: STUDENT IN AN ORGANIZED HEALTH CARE EDUCATION/TRAINING PROGRAM
Payer: MEDICARE

## 2022-06-30 VITALS
HEIGHT: 60 IN | HEART RATE: 65 BPM | WEIGHT: 171 LBS | DIASTOLIC BLOOD PRESSURE: 70 MMHG | RESPIRATION RATE: 16 BRPM | SYSTOLIC BLOOD PRESSURE: 132 MMHG | OXYGEN SATURATION: 95 % | BODY MASS INDEX: 33.57 KG/M2 | TEMPERATURE: 98.1 F

## 2022-06-30 DIAGNOSIS — L30.8 PSORIASIFORM ECZEMA: ICD-10-CM

## 2022-06-30 DIAGNOSIS — R76.8 POSITIVE ANA (ANTINUCLEAR ANTIBODY): ICD-10-CM

## 2022-06-30 DIAGNOSIS — M15.9 OSTEOARTHRITIS OF MULTIPLE JOINTS, UNSPECIFIED OSTEOARTHRITIS TYPE: ICD-10-CM

## 2022-06-30 LAB
CRP SERPL HS-MCNC: 0.4 MG/DL (ref 0–0.75)
ERYTHROCYTE [SEDIMENTATION RATE] IN BLOOD BY WESTERGREN METHOD: 18 MM/HOUR (ref 0–25)

## 2022-06-30 PROCEDURE — 86812 HLA TYPING A B OR C: CPT | Mod: GA

## 2022-06-30 PROCEDURE — 85652 RBC SED RATE AUTOMATED: CPT

## 2022-06-30 PROCEDURE — 36415 COLL VENOUS BLD VENIPUNCTURE: CPT

## 2022-06-30 PROCEDURE — 86140 C-REACTIVE PROTEIN: CPT

## 2022-06-30 PROCEDURE — 99204 OFFICE O/P NEW MOD 45 MIN: CPT | Performed by: STUDENT IN AN ORGANIZED HEALTH CARE EDUCATION/TRAINING PROGRAM

## 2022-06-30 RX ORDER — LOSARTAN POTASSIUM 25 MG/1
25 TABLET ORAL DAILY
COMMUNITY
Start: 2022-06-06

## 2022-06-30 RX ORDER — TIMOLOL MALEATE 5 MG/ML
SOLUTION/ DROPS OPHTHALMIC
COMMUNITY
Start: 2022-06-27

## 2022-06-30 ASSESSMENT — PAIN SCALES - GENERAL: PAINLEVEL: 9=SEVERE PAIN

## 2022-06-30 NOTE — PROGRESS NOTES
[facility]  [address] [place] [address], [address], [place] [address]  Phone: [phone] / Fax: [phone]    RHEUMATOLOGY NEW PATIENT VISIT NOTE      DATE OF SERVICE: 06/30/2022    REFERRING PROVIDER:  TADEO Hassan  [address] [place] [address]      SUBJECTIVE:     CHIEF COMPLAINT:   Chief Complaint   Patient presents with   • New Patient     Evaluation for Positive AMAN        HISTORY OF PRESENT ILLNESS:  Tamika Ken is a 76 y.o. 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 She presents for evaluation of arthralgia of hips and knees in the setting of positive AMAN. Reports onset of symptoms in 2018 preceded by a fall after which she developed joint pain in her knees and hips which were exacerbated by a second fall in 2019. Notes up to 1 hour of morning stiffness that improves with activity but her joint pain worsens with activity especially when walking on uneven ground. These have affected her mobility and gait such that she now uses a walker and wears medical alert necklace. She has had physical therapy which was helpful, but trials of Tylenol, Advil, and Aleve have not been helpful.     Pertinent labs as of 4/2022: Positive AMAN 1:320 nuclear/speckled pattern with positive anti-SSA >8, mildly elevated ESR 21; negative/normal anti-histone, RF, uric acid, unremarkable CBC and CMP.    REVIEW OF SYSTEMS:  Except as noted in the history above, a complete review of systems with emphasis on autoimmune inflammatory conditions was otherwise negative for any significant symptoms.      ACTIVE PROBLEM LIST:  Patient Active Problem List   Diagnosis   • Postmenopausal bleeding   • Postoperative pain   • Endometrial cancer (HCC)   •  Positive AMAN (positive anti-SSA)   • Osteoarthritis of multiple joints   • Psoriasiform eczema   No problem-specific Assessment & Plan notes found for this encounter.      PAST MEDICAL HISTORY:  Past Medical History:   Diagnosis Date   • Arthritis     back, hips, fingers   • Asthma    • Breath shortness     feels congested and short of breath easily   • Cancer (HCC)     endometrial cancer   • Eczema    • Glaucoma    • Gynecological disorder     irrgular postmenopausal bleeding   • Hypertension    • Polio     7 years old, joints are more fragile   • Stroke (HCC) 2014    no deficits, diagnosed later,        PAST SURGICAL HISTORY:  Past Surgical History:   Procedure Laterality Date   • HYSTERECTOMY ROBOTIC XI  12/14/2018    Procedure: HYSTERECTOMY ROBOTIC XI;  Surgeon: Ab Trevino M.D.;  Location: SURGERY [facility];  Service: Gynecology   • SALPINGECTOMY Bilateral 12/14/2018    Procedure: SALPINGECTOMY;  Surgeon: Ab Trevino M.D.;  Location: SURGERY [facility];  Service: Gynecology   • OOPHORECTOMY Bilateral 12/14/2018    Procedure: OOPHORECTOMY;  Surgeon: Ab Trevino M.D.;  Location: SURGERY [facility];  Service: Gynecology   • NODE BIOPSY SENTINEL  12/14/2018    Procedure: NODE BIOPSY SENTINEL;  Surgeon: Ab Trevino M.D.;  Location: SURGERY [facility];  Service: Gynecology   • HYSTEROSCOPY WITH VIDEO OPERATIVE  9/28/2018    Procedure: HYSTEROSCOPY WITH VIDEO OPERATIVE;  Surgeon: Mickey Wray M.D.;  Location: SURGERY [facility];  Service: Gynecology   • DILATION AND CURETTAGE  9/28/2018    Procedure: DILATION AND CURETTAGE;  Surgeon: Mickey Wray M.D.;  Location: SURGERY [facility];  Service: Gynecology   • OTHER  2014    glaucoma surgery for right eye   • OTHER  2014    glaucoma surgery for left eye   • TRIGGER FINGER RELEASE  2013   • TONSILLECTOMY         MEDICATIONS:  Current Outpatient Medications   Medication Sig Dispense Refill   • timolol (TIMOPTIC) 0.5 % Solution     "  • losartan (COZAAR) 25 MG Tab Take 25 mg by mouth every day.     • diclofenac sodium (VOLTAREN) 1 % Gel Apply 2 g topically 3 times a day as needed (for osteoarthritis pain). 100 g 3   • triamterene-hydrochlorothiazide (MAXZIDE 75-50) 75-50 MG Tab Take 1 Tab by mouth every day.     • potassium chloride SA (KDUR) 20 MEQ Tab CR Take 20 mEq by mouth every day.     • metoprolol SR (TOPROL XL) 25 MG TABLET SR 24 HR Take 25 mg by mouth every day.     • latanoprost (XALATAN) 0.005 % Solution Place 1 Drop in both eyes every bedtime.     • aspirin EC (ECOTRIN) 81 MG Tablet Delayed Response Take 81 mg by mouth 2 Times a Day.       No current facility-administered medications for this visit.       ALLERGIES:   No Known Allergies    IMMUNIZATIONS:  Immunization History   Administered Date(s) Administered   • MODERNA SARS-COV-2 VACCINE (12+) 01/07/2021, 02/04/2021, 11/11/2021       SOCIAL HISTORY:   Social History     Tobacco Use   • Smoking status: Never Smoker   • Smokeless tobacco: Never Used   Substance Use Topics   • Alcohol use: Yes     Comment: 1-2 a week   • Drug use: Yes     Comment: medical marijuana, sublinguinal, last use 1 week ago       FAMILY HISTORY:  History reviewed. No pertinent family history.     OBJECTIVE:     Vital Signs: /70 (BP Location: Left arm, Patient Position: Sitting, BP Cuff Size: Adult)   Pulse 65   Temp 36.7 °C (98.1 °F) (Temporal)   Resp 16   Ht 1.511 m (4' 11.5\")   Wt 77.6 kg (171 lb)   SpO2 95% Body mass index is 33.96 kg/m².    General: Appears well and comfortable  Eyes: No scleral or conjunctival lesions  ENT: No visible oral or nasal lesions  Head/Neck: No visible scalp or neck lesions  Cardiovascular: Regular rate and rhythm; no pericardial rubs  Respiratory: Breathing quiet and unlabored; no rales or pleural rubs  Gastrointestinal: No organomegaly or abdominal masses  Integumentary: Psoriasiform rash on the palm of both hands  Musculoskeletal: Mild tenderness of knees " medial aspects with mild crepitus; bony hypertrophy of DIP joints on several fingers; no periarticular soft tissue swelling, warmth, erythema, or overt signs of synovitis; no significant restriction in range of motion of joints examined  Neurologic: No focal sensory or motor deficits  Psychiatric: Mood and affect appropriate      LABORATORY RESULTS REVIEWED AND INTERPRETED BY ME:  Lab Results   Component Value Date/Time    PROTHROMBTM 13.7 12/14/2018 06:14 AM    INR 1.04 12/14/2018 06:14 AM     Lab Results   Component Value Date/Time    ASTSGOT 32 12/14/2018 06:14 AM    ALTSGPT 53 (H) 12/14/2018 06:14 AM    ALKPHOSPHAT 72 12/14/2018 06:14 AM    TBILIRUBIN 0.7 12/14/2018 06:14 AM    TOTPROTEIN 7.8 12/14/2018 06:14 AM    ALBUMIN 4.6 12/14/2018 06:14 AM     Lab Results   Component Value Date/Time    SODIUM 138 12/14/2018 06:14 AM    POTASSIUM 3.3 (L) 12/14/2018 06:14 AM    CHLORIDE 99 12/14/2018 06:14 AM    CO2 28 12/14/2018 06:14 AM    GLUCOSE 95 12/14/2018 06:14 AM    BUN 13 12/14/2018 06:14 AM    CREATININE 0.71 12/14/2018 06:14 AM    CALCIUM 9.9 12/14/2018 06:14 AM     Lab Results   Component Value Date/Time    WBC 8.3 12/14/2018 06:14 AM    RBC 4.68 12/14/2018 06:14 AM    HEMOGLOBIN 13.8 12/14/2018 06:14 AM    HEMATOCRIT 43.0 12/14/2018 06:14 AM    MCV 91.9 12/14/2018 06:14 AM    MCH 29.5 12/14/2018 06:14 AM    MCHC 32.1 (L) 12/14/2018 06:14 AM    RDW 43.2 12/14/2018 06:14 AM    PLATELETCT 270 12/14/2018 06:14 AM    MPV 9.6 12/14/2018 06:14 AM    NEUTS 5.11 12/14/2018 06:14 AM    LYMPHOCYTES 28.70 12/14/2018 06:14 AM    MONOCYTES 7.40 12/14/2018 06:14 AM    EOSINOPHILS 1.30 12/14/2018 06:14 AM    BASOPHILS 0.60 12/14/2018 06:14 AM       RADIOLOGY RESULTS REVIEWED AND INTERPRETED BY ME:  No loadable results found in the system.      All relevant laboratory and imaging results reported on this note were reviewed and interpreted by me.      ASSESSMENT AND PLAN:     Tamika Ken is a 76 y.o. female with history  as noted above whose presentation merits the following diagnostic and clinical status impressions and recommendations:    1. Positive AMAN (positive anti-SSA)  There is insufficient objective historical, physical, and laboratory evidence to suggest the presence of an underlying AMAN-associated autoimmune disease, such as systemic lupus, Sjogren syndrome, inflammatory myopathy, or systemic sclerosis. Notably, the AMAN test is highly sensitive and lacks diagnostic specificity as it can be seen in a variety of non-rheumatic conditions, such as acute or chronic bacterial or viral infections, autoimmune thyroid disease (Hashimoto's thyroiditis or Graves' disease), autoimmune hepatobiliary disease, inflammatory bowel disease, and lymphoproliferative disease or malignancy, as well as in over 10% of healthy individuals with no clinical evidence of autoimmune rheumatic disease. In any case, it must be interpreted in the context of the overall clinical picture with close attention to disease-specific manifestations. That said, the presence of persistently positive AMAN, especially in high or rising titers, does confer some risk of AMAN-associated disease, so if unexplainable inflammatory symptoms develop and/or persist, clinical follow-up for re-evaluation may be reasonable. For now, reasonable to undertake the additional workup noted below for exclusionary, confirmatory, and risk stratification purposes.  - Sed Rate  - CRP QUANTITIVE (NON-CARDIAC)    2. Osteoarthritis of multiple joints (hands, hips, lower back)  Presumably the most significant contributor to her overall joint pain burden which can be managed with topical NSAID and analgesics.  - DX-HIP-BILATERAL-WITH PELVIS-2 VIEWS  - Diclofenac sodium (VOLTAREN) 1 % Gel; Apply 2 g topically 3 times a day as needed (for osteoarthritis pain).  Dispense: 100 g; Refill: 3  - Consider intra-articular steroid injection if needed    3. Psoriasiform eczema  In the context of her  arthritic symptoms, need to assess risk for psoriasis with psoriatic arthritis.  - [id number]      FOLLOW-UP: Return in about 3 months (around 9/30/2022) for Short.           Thank you for giving me the opportunity to participate in the care of Tamika Ken.    Brady Mcdermott MD, MS  Rheumatologist, [facility]  , Department of Internal Medicine  [facility]

## 2022-06-30 NOTE — PATIENT INSTRUCTIONS
AFTER VISIT INSTRUCTIONS    Below are important information to help you navigate your healthcare needs and help us serve you safely and effectively:  If laboratory tests and/or imaging studies were ordered, remember to go get them done.  If new prescriptions or refills were sent to the pharmacy, remember to go pick them up.  Take your medications exactly as prescribed unless instructed otherwise.  Follow up with your primary care provider and/or specialists as scheduled.  If there are significant findings from your lab tests and imaging studies, I will notify you with explanations via BookingNestt or phone call, otherwise you can view them on [email or web address] and let me know if you have any questions.  Sign up for [email or web address] if you have not already done so, in order to have access to the results of your lab tests and imaging studies, and to be able to send and receive messages from me.  Note that zeeWAVES messaging is for non-urgent matters that do not require immediate attention and are typically read only during office hours, so do not expect immediate responses from me.

## 2022-07-01 ENCOUNTER — NON-PROVIDER VISIT (OUTPATIENT)
Dept: URGENT CARE | Facility: PHYSICIAN GROUP | Age: 77
End: 2022-07-01
Payer: MEDICARE

## 2022-07-01 ENCOUNTER — APPOINTMENT (OUTPATIENT)
Dept: RADIOLOGY | Facility: IMAGING CENTER | Age: 77
End: 2022-07-01
Attending: STUDENT IN AN ORGANIZED HEALTH CARE EDUCATION/TRAINING PROGRAM
Payer: MEDICARE

## 2022-07-01 PROCEDURE — 73521 X-RAY EXAM HIPS BI 2 VIEWS: CPT | Mod: TC,FY | Performed by: FAMILY MEDICINE

## 2022-07-02 LAB — HLA-B27 QL FC: NEGATIVE

## 2022-09-30 ENCOUNTER — APPOINTMENT (OUTPATIENT)
Dept: RHEUMATOLOGY | Facility: MEDICAL CENTER | Age: 77
End: 2022-09-30
Attending: STUDENT IN AN ORGANIZED HEALTH CARE EDUCATION/TRAINING PROGRAM
Payer: MEDICARE

## 2022-10-13 DIAGNOSIS — M15.9 OSTEOARTHRITIS OF MULTIPLE JOINTS, UNSPECIFIED OSTEOARTHRITIS TYPE: ICD-10-CM

## 2022-12-13 ENCOUNTER — APPOINTMENT (OUTPATIENT)
Dept: RHEUMATOLOGY | Facility: MEDICAL CENTER | Age: 77
End: 2022-12-13
Attending: STUDENT IN AN ORGANIZED HEALTH CARE EDUCATION/TRAINING PROGRAM
Payer: MEDICARE

## 2023-02-08 ASSESSMENT — RHEUMATOLOGY FOLLOW-UP QUESTIONNAIRE
BLOODY CONSTIPATION: N
BLOODY DIARRHEA: N
MARK ALL THE AREAS OF PAIN: 82243620
PALPITATIONS: N
THYROID PAIN: N
NASAL ULCERS: Y
NECK PAIN: N
HAIR SHEDDING: N
DRY MOUTH: Y
VERTIGO: N
SINUS PAIN: Y
CAVITIES: N
HEARING LOSS: N
RATE_1TO10: 8
SHORTNESS OF BREATH: Y
COLD-INDUCED COLOR CHANGES (WHITE, PURPLE, RED ON REWARMING): NOSE
CHILLS: Y
ABDOMINAL PAIN: N
ANXIETY: Y
GOITER: N
JOINT SWELLING: N
BLURRINESS: N
JOINT PAIN: BETTER WITH ACTIVITY
GENITAL ULCERS: N
BLEEDING GUMS: N
RINGING IN EARS: N
MUSCLE WEAKNESS: Y
SKIN THICKENING: N
EYE REDNESS: N
DIFFICULTY SWALLOWING: Y
NOSEBLEEDS: N
ACHILLES TENDON PAIN: N
HEARTBURN: N
EAR PAIN: N
DIZZINESS: Y
IRREGULAR BEATS: N
VISION LOSS: N
TEMPLE PAIN: N
FEVERS: Y
ORAL ULCERS: N
DIFFICULTY SPEAKING: Y
AGGRAVATING_FACTORS: COLD WEATHER
EYE PAIN: N
DRY COUGH: Y
SINONASAL CONGESTION: Y
NAUSEA: N
NIGHT SWEATS: N
CHEST PAIN WITH BREATHING: N
BURNING URINATION: Y
UNINTENTIONAL WEIGHT LOSS: <10 LBS
PERSONAL OR EMOTIONAL STRESSORS: YES
NUMBNESS: N
VOMITING: N
MUSCLE PAIN: Y
PELVIC PAIN: N
HEADACHES: N
HAIR LOSS WITH BALD SPOTS: N
SORE THROAT: Y
BODY ACHES: Y
DEPRESSION: Y
COUGH WITH BLOODY PHLEGM: N
ABNORMAL DISCHARGE: N
SKIN PLAQUES: N
MORNING STIFFNESS: 30-60 MINS
DRY EYES: Y

## 2023-02-09 ENCOUNTER — TELEMEDICINE (OUTPATIENT)
Dept: RHEUMATOLOGY | Facility: MEDICAL CENTER | Age: 78
End: 2023-02-09
Attending: STUDENT IN AN ORGANIZED HEALTH CARE EDUCATION/TRAINING PROGRAM
Payer: MEDICARE

## 2023-02-09 VITALS — WEIGHT: 155 LBS | BODY MASS INDEX: 30.43 KG/M2 | HEIGHT: 60 IN

## 2023-02-09 DIAGNOSIS — M16.0 PRIMARY OSTEOARTHRITIS OF BOTH HIPS: ICD-10-CM

## 2023-02-09 DIAGNOSIS — R76.8 SEROLOGIC AUTOIMMUNITY: ICD-10-CM

## 2023-02-09 PROCEDURE — 99214 OFFICE O/P EST MOD 30 MIN: CPT | Mod: 95 | Performed by: STUDENT IN AN ORGANIZED HEALTH CARE EDUCATION/TRAINING PROGRAM

## 2023-02-09 PROCEDURE — 99212 OFFICE O/P EST SF 10 MIN: CPT | Mod: 95 | Performed by: STUDENT IN AN ORGANIZED HEALTH CARE EDUCATION/TRAINING PROGRAM

## 2023-02-09 RX ORDER — CRISABOROLE 20 MG/G
OINTMENT TOPICAL
COMMUNITY
Start: 2023-02-04

## 2023-02-09 RX ORDER — HYDROXYZINE PAMOATE 25 MG/1
25 CAPSULE ORAL DAILY
COMMUNITY

## 2023-02-09 NOTE — PATIENT INSTRUCTIONS
AFTER VISIT INSTRUCTIONS    Below are important information to help you navigate your healthcare needs and help us serve you safely and effectively:  If laboratory tests and/or imaging studies were ordered, remember to go get them done as instructed.  If new prescriptions and/or refills were sent, remember to go pick them up from your local pharmacy, or call the specialty pharmacy to request shipment.  Always take your prescription medications exactly as prescribed unless instructed otherwise.  Note that antirheumatic drugs and steroids are immunosuppressive which means they increase your risk of infections and have multiple potential adverse effects on various organ systems in your body, though most of them are uncommon.  It is important that you are up-to-date on age-appropriate immunizations, particularly shingles and bacterial/viral pneumonia vaccines, which you can request from me or your primary care provider.  Be sure to read the drug package inserts to learn about the potential side effects of your medications before you start taking them.  If you experience any significant drug side effects, stop taking the medication and notify me promptly, and depending on the severity of the side effects, consider going to an urgent care or emergency department for immediate attention.  If there are significant findings on your lab tests and imaging studies that warrant further action, I will notify you with explanations via [email or web address] or phone call, otherwise you can view them on [employer]isMedia and let me know if you have any questions.  Note that [employer] messages are typically read during office hours and may take 1-7 business days before a response depending on the urgency of the situation and how busy my clinic schedule is.  In general, [employer] messaging is for non-urgent matters that do not require immediate attention, so for urgent matters that cannot wait, you are advised to go to an urgent care.  Lastly, you are granted  MyChart access to my documentation of your visit and are encouraged to read my note which details my assessment and plan for your condition.

## 2023-02-09 NOTE — PROGRESS NOTES
[facility]  [address] [facility], [address], [place] [address]  Phone: [phone] ? Fax: [phone]    RHEUMATOLOGY VIRTUAL (VIDEO) FOLLOW-UP VISIT NOTE      This evaluation was conducted via Zoom using secure and encrypted videoconferencing technology for virtual visit. The patient was in their home in the [facility]. The patient's identity was confirmed and verbal consent was obtained for this encounter.      DATE OF SERVICE: 02/09/2023         Subjective     PRIMARY CARE PRACTITIONER:  TADEO Hassan  [address] [id number]    PATIENT IDENTIFICATION:  Tamika Ken  [address] [facility] [address]    YOB: 1945    MEDICAL RECORD NUMBER: [id number]         CHIEF COMPLAINT:   Chief Complaint   Patient presents with    Follow-Up     Arthritis       RHEUMATOLOGIC HISTORY:  Tamika Ken is a 77 y.o. 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 She initially presented on 6/30/22 for evaluation of arthralgia of hips and knees in the setting of positive AMAN. Reported onset of symptoms in 2018 preceded by a fall after which she developed joint pain in her knees and hips which were exacerbated by a second fall in 2019. These were associated with up to 1 hour of morning stiffness that improved with activity but her joint pain worsened with much activity especially when walking on uneven ground. These had affected her mobility and gait such that she started using a walker and wearing medical alert necklace. She had undergone physical therapy which was helpful, but trials of Tylenol, Advil, and Aleve had not been helpful.     Pertinent treatment history: Voltaren gel (helpful).    Pertinent lab results  history: Positive AMAN 1:320 nuclear/speckled pattern with positive anti-SSA >8 (in 4/2022); negative/normal HLA-B27, ESR and CRP (in 6/2022), RF, anti-histone, uric acid, unremarkable CBC and CMP.    Pertinent XR imaging as of 6/2022: Hips with moderate osteoarthritis of bilateral femoral acetabular joints.    INTERVAL HISTORY:  [facility] Rheumatology Established Patient History Form    2/8/2023  4:54 PM PST - Filed by Patient   Chief Complaint Holiday Disasters   Interval History of Present Condition   Date of worsening symptom onset: 1/27/2023   Preceding incident/ailment: Mon 11/23 Severe Cold  01/27 Fri. onset of severe  flu symptoms -   Describe/list your symptoms: Onset of flu symptoms,  the arthritis was very painful.  Could not lift my legs to raise them even onto a foot stool.  Doing better now.  Some energy seems to be returning-but slowly.  There was a short period before all this  that I was doing much better   Aggravating factors: COLD WEATHER   Alleviating factors: Hot Baths - Sitting Up   Helpful medications:    Ineffective medications: Took Robutusin for hacking cough - Only twice   Symptom severity/impact (scale of 1-10): 8   Personal/emotional stressors: Yes   [place] All The Locations Of Pain    REVIEW OF SYSTEMS    General   Fevers Yes   Chills Yes   Night sweats No   Unintentional Weight Loss <10 lbs   Musculoskeletal   Joint pain Better with activity   Morning stiffness 30-60 mins   Joint swelling No   Achilles tendon pain No   Muscle pain Yes   Body Aches Yes   Dermatologic   Hair loss with bald spots No   Hair shedding No   Sunlight-induced skin rash    Skin thickening No   Skin plaques No   Cold-induced color changes (white, purple, red on rewarming) Nose   Neurologic/Psychiatric   Muscle weakness Yes   Spasms    Tingling    Numbness No   Anxiety Yes   Depression Yes   Head/Eyes   Headaches No   Temple pain No   Dizziness Yes   Dry eyes Yes   Eye pain No   Eye redness No   Blurriness No   Vision  loss No   Ears/Nose   Ear pain No   Ringing in ears No   Vertigo No   Hearing loss No   Nasal ulcers Yes   Nosebleeds No   Sinus pain Yes   Sinonasal congestion Yes   Snoring    Mouth/Throat   Oral ulcers No   Bleeding gums No   Dry mouth Yes   Cavities No   Sore throat Yes   Difficulty speaking Yes   Difficulty swallowing Yes   Neck/Lymphatics   Neck pain No   Thyroid pain No   Goiter No   Swollen Glands    Cardiac/Respiratory   Chest pain with breathing No   Dry cough Yes   Cough with bloody phlegm No   Shortness of breath Yes   Palpitations No   Irregular beats No   Gastrointestinal   Nausea No   Vomiting No   Heartburn No   Abdominal pain No   Bloody diarrhea No   Bloody constipation No   Genitourinary   Pelvic Pain No   Genital ulcers No   Abnormal discharge No   Burning urination Yes   Frothy urine    Blood in urine    Supplemental Information   Notable Life Changes/Adjustments Since Last Visit Was doing well - able to drive car and shop for groceries, run errands.  The right hip was almost symptom free,  could actually walk without severe pain and put weight on it.   However the left side was painful and would't support  me. 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    Osteoarthritis of multiple joints 06/30/2022    Psoriasiform eczema 06/30/2022    Endometrial cancer (HCC) 04/26/2019    Postoperative pain 12/14/2018    Postmenopausal bleeding 09/28/2018       PAST MEDICAL HISTORY:  Past Medical History:   Diagnosis Date    Arthritis     back, hips, fingers    Asthma     Breath shortness     feels congested and short of breath easily    Cancer (HCC)     endometrial cancer    Eczema     Glaucoma     Gynecological disorder     irrgular postmenopausal bleeding    Hypertension     Polio     7 years old, joints are more fragile    Stroke ([facility]) 2014    no deficits, diagnosed later,        PAST SURGICAL HISTORY:  Past  Surgical History:   Procedure Laterality Date    HYSTERECTOMY ROBOTIC XI  12/14/2018    Procedure: HYSTERECTOMY ROBOTIC XI;  Surgeon: Ab Trevino M.D.;  Location: SURGERY [facility];  Service: Gynecology    SALPINGECTOMY Bilateral 12/14/2018    Procedure: SALPINGECTOMY;  Surgeon: Ab Trevino M.D.;  Location: SURGERY [facility];  Service: Gynecology    OOPHORECTOMY Bilateral 12/14/2018    Procedure: OOPHORECTOMY;  Surgeon: Ab Trevino M.D.;  Location: SURGERY [facility];  Service: Gynecology    NODE BIOPSY SENTINEL  12/14/2018    Procedure: NODE BIOPSY SENTINEL;  Surgeon: Ab Trevino M.D.;  Location: SURGERY [facility];  Service: Gynecology    HYSTEROSCOPY WITH VIDEO OPERATIVE  9/28/2018    Procedure: HYSTEROSCOPY WITH VIDEO OPERATIVE;  Surgeon: Mickey Wray M.D.;  Location: SURGERY [facility];  Service: Gynecology    DILATION AND CURETTAGE  9/28/2018    Procedure: DILATION AND CURETTAGE;  Surgeon: Mickey Wray M.D.;  Location: SURGERY [facility];  Service: Gynecology    OTHER  2014    glaucoma surgery for right eye    OTHER  2014    glaucoma surgery for left eye    TRIGGER FINGER RELEASE  2013    TONSILLECTOMY         MEDICATIONS:  Current Outpatient Medications   Medication Sig    EUCRISA 2 % Ointment     hydrOXYzine pamoate (VISTARIL) 25 MG Cap Take 25 mg by mouth every day.    diclofenac sodium (VOLTAREN) 1 % Gel Apply 2 g topically 3 times a day as needed (for osteoarthritis pain).    timolol (TIMOPTIC) 0.5 % Solution     losartan (COZAAR) 25 MG Tab Take 25 mg by mouth every day.    triamterene-hydrochlorothiazide (MAXZIDE 75-50) 75-50 MG Tab Take 1 Tab by mouth every day.    potassium chloride SA (KDUR) 20 MEQ Tab CR Take 20 mEq by mouth every day.    metoprolol SR (TOPROL XL) 25 MG TABLET SR 24 HR Take 25 mg by mouth every day.    latanoprost (XALATAN) 0.005 % Solution Place 1 Drop in both eyes every bedtime.    aspirin EC (ECOTRIN) 81 MG Tablet Delayed Response Take  "81 mg by mouth 2 Times a Day.       ALLERGIES:   No Known Allergies    IMMUNIZATIONS:  Immunization History   Administered Date(s) Administered    MODERNA BIVALENT BOOSTER SARS-COV-2 VACCINE (6+) 10/18/2022    MODERNA SARS-COV-2 VACCINE (12+) 01/07/2021, 02/04/2021, 11/11/2021       SOCIAL HISTORY:   Social History     Socioeconomic History    Marital status:    Occupational History    Occupation: retired    Tobacco Use    Smoking status: Never    Smokeless tobacco: Never   Substance and Sexual Activity    Alcohol use: Yes     Comment: 1-2 a week    Drug use: Yes     Comment: medical marijuana, sublinguinal, last use 1 week ago       FAMILY HISTORY:  No family history on file.         Objective     Vital Signs: Ht 1.518 m (4' 11.75\")   Wt 70.3 kg (155 lb) Body mass index is 30.53 kg/m².    General: Not applicable  Eyes: Not applicable  ENT: Not applicable  Head/Neck: Not applicable  Cardiovascular: Not applicable  Respiratory: Not applicable  Gastrointestinal: Not applicable  Integumentary: Not applicable  Musculoskeletal: Not applicable  Neurologic: Not applicable  Psychiatric: Mood appropriate      PAST LABORATORY RESULTS REVIEWED AND INTERPRETED BY ME:  Lab Results   Component Value Date/Time    SEDRATEWES 18 06/30/2022 02:40 PM    CREACTPROT 0.40 06/30/2022 02:40 PM     Lab Results   Component Value Date/Time    QPSA96UDUK Negative 06/30/2022 02:40 PM     Lab Results   Component Value Date/Time    PROTHROMBTM 13.7 12/14/2018 06:14 AM    INR 1.04 12/14/2018 06:14 AM     Lab Results   Component Value Date/Time    WBC 8.3 12/14/2018 06:14 AM    RBC 4.68 12/14/2018 06:14 AM    HEMOGLOBIN 13.8 12/14/2018 06:14 AM    HEMATOCRIT 43.0 12/14/2018 06:14 AM    MCV 91.9 12/14/2018 06:14 AM    MCH 29.5 12/14/2018 06:14 AM    MCHC 32.1 (L) 12/14/2018 06:14 AM    RDW 43.2 12/14/2018 06:14 AM    PLATELETCT 270 12/14/2018 06:14 AM    MPV 9.6 12/14/2018 06:14 AM    NEUTS 5.11 12/14/2018 06:14 AM    LYMPHOCYTES " 28.70 12/14/2018 06:14 AM    MONOCYTES 7.40 12/14/2018 06:14 AM    EOSINOPHILS 1.30 12/14/2018 06:14 AM    BASOPHILS 0.60 12/14/2018 06:14 AM     Lab Results   Component Value Date/Time    ASTSGOT 32 12/14/2018 06:14 AM    ALTSGPT 53 (H) 12/14/2018 06:14 AM    ALKPHOSPHAT 72 12/14/2018 06:14 AM    TBILIRUBIN 0.7 12/14/2018 06:14 AM    TOTPROTEIN 7.8 12/14/2018 06:14 AM    ALBUMIN 4.6 12/14/2018 06:14 AM     Lab Results   Component Value Date/Time    SODIUM 138 12/14/2018 06:14 AM    POTASSIUM 3.3 (L) 12/14/2018 06:14 AM    CHLORIDE 99 12/14/2018 06:14 AM    CO2 28 12/14/2018 06:14 AM    GLUCOSE 95 12/14/2018 06:14 AM    BUN 13 12/14/2018 06:14 AM    CREATININE 0.71 12/14/2018 06:14 AM    CALCIUM 9.9 12/14/2018 06:14 AM       PAST RADIOLOGY RESULTS REVIEWED AND INTERPRETED BY ME:  No loadable results found in the system. Pertinent non-system results, if applicable, summarized under history above.      All relevant laboratory and imaging results reported on this note were reviewed and interpreted by me.         Assessment & Plan     Tamika Ken is a 77 y.o. female with history as noted above whose presentation merits the following diagnostic and clinical status impressions and recommendations:    1. Primary osteoarthritis of both hips  Presumably the most significant contributor to her overall joint pain burden which can be managed with oral NSAIDs and analgesics. However, given the persistence of her symptoms, reasonable to refer for interventional pain management.  - Referral to Pain Management (imaging-guided intra-articular steroid/anesthetic injection)    2. Serologic autoimmunity (positive anti-SSA)  Serologic evidence of immunologic activity with no objective clinical evidence of an underlying Sjogren syndrome. However, the possibility of evolving subclinical disease cannot be entirely excluded, so if unexplainable inflammatory symptoms develop and persist, clinical follow-up for re-evaluation would be  reasonable.  - No indication for additional AMAN-related lab testing at this time      FOLLOW-UP: Return for as needed.         Thank you for the opportunity to participate in the care of Tamika Ken.    Brady Mcdermott MD, MS  Rheumatologist, [facility] Rheumatology ? [facility]   of Clinical Medicine, Department of Internal Medicine  [facility] ? [facility]

## 2023-02-13 ENCOUNTER — TELEPHONE (OUTPATIENT)
Dept: RHEUMATOLOGY | Facility: MEDICAL CENTER | Age: 78
End: 2023-02-13
Payer: MEDICARE

## 2023-02-13 DIAGNOSIS — M16.0 PRIMARY OSTEOARTHRITIS OF BOTH HIPS: ICD-10-CM

## 2023-02-14 NOTE — TELEPHONE ENCOUNTER
Please add new referral     Physiatry [facility] Hellen Vega MD.

## 2023-04-04 ENCOUNTER — APPOINTMENT (OUTPATIENT)
Dept: PHYSICAL MEDICINE AND REHAB | Facility: MEDICAL CENTER | Age: 78
End: 2023-04-04
Payer: MEDICARE

## (undated) DEVICE — GLOVE SZ 6 BIOGEL PI MICRO - PF LF (50PR/BX 4BX/CA)

## (undated) DEVICE — TUBING CLEARLINK DUO-VENT - C-FLO (48EA/CA)

## (undated) DEVICE — DRESSING NON ADHERENT 3 X 4 - STERILE (100/BX 12BX/CA)

## (undated) DEVICE — JELLY SURGILUBE STERILE TUBE 4.25 OZ (1/EA)

## (undated) DEVICE — SENSOR SPO2 NEO LNCS ADHESIVE (20/BX) SEE USER NOTES

## (undated) DEVICE — SYRINGE ASEPTO - (50EA/CA

## (undated) DEVICE — ELECTRODE 850 FOAM ADHESIVE - HYDROGEL RADIOTRNSPRNT (50/PK)

## (undated) DEVICE — CANISTER SUCTION 3000ML MECHANICAL FILTER AUTO SHUTOFF MEDI-VAC NONSTERILE LF DISP  (40EA/CA)

## (undated) DEVICE — Device

## (undated) DEVICE — NEEDLE DRIVER LARGE DA VINCI 10X'S REUSABLE

## (undated) DEVICE — SUCTION INSTRUMENT YANKAUER BULBOUS TIP W/O VENT (50EA/CA)

## (undated) DEVICE — WATER IRRIG. STER 3000 ML - (4/CA)

## (undated) DEVICE — GLOVE BIOGEL PI INDICATOR SZ 6.5 SURGICAL PF LF - (50/BX 4BX/CA)

## (undated) DEVICE — GLOVE BIOGEL SZ 6.5 SURGICAL PF LTX (50PR/BX 4BX/CA)

## (undated) DEVICE — LACTATED RINGERS INJ 1000 ML - (14EA/CA 60CA/PF)

## (undated) DEVICE — TUBE E-T HI-LO CUFF 7.0MM (10EA/PK)

## (undated) DEVICE — PROTECTOR ULNA NERVE - (36PR/CA)

## (undated) DEVICE — FORCEPS MARYLAND BIPOLAR DA VINCI 10X'S REUSABLE

## (undated) DEVICE — SPATULA PERMANENT CAUTERY DA VINCI 10X'S REUSABLE

## (undated) DEVICE — BRIEF STRETCH MATERNITY M/L - FITS 20-60IN (5EA/BG 20BG/CA)

## (undated) DEVICE — GLOVE SZ 6.5 BIOGEL PI MICRO - PF LF (50PR/BX)

## (undated) DEVICE — DETERGENT RENUZYME PLUS 10 OZ PACKET (50/BX)

## (undated) DEVICE — SODIUM CHL IRRIGATION 0.9% 1000ML (12EA/CA)

## (undated) DEVICE — KIT ROOM DECONTAMINATION

## (undated) DEVICE — HEAD HOLDER JUNIOR/ADULT

## (undated) DEVICE — ELECTRODE DUAL RETURN W/ CORD - (50/PK)

## (undated) DEVICE — TRAY SRGPRP PVP IOD WT PRP - (20/CA)

## (undated) DEVICE — SEAL 5MM-8MM UNIVERSAL  BOX OF 10

## (undated) DEVICE — SUTURE 0 VICRYL PLUS CT-2 - 27 INCH (36/BX)

## (undated) DEVICE — MASK ANESTHESIA ADULT  - (100/CA)

## (undated) DEVICE — KIT ANESTHESIA W/CIRCUIT & 3/LT BAG W/FILTER (20EA/CA)

## (undated) DEVICE — PAD OR TABLE DA VINCI 2IN X 20IN X 72IN - (12EA/CA)

## (undated) DEVICE — DRAPE UNDER BUTTOCKS FLUID - (20/CA)

## (undated) DEVICE — SUTURE 2-0 VICRYL PLUS CT-2 - 27 INCH (36/BX)

## (undated) DEVICE — GLOVE BIOGEL PI ORTHO SZ 6 SURGICAL PF LF (40PR/BX)

## (undated) DEVICE — TUBING INFLOW HYSTEROSCOPY (10EA/CA)

## (undated) DEVICE — GOWN WARMING STANDARD FLEX - (30/CA)

## (undated) DEVICE — CLIP HEMOLOCK PURPLE - (14/BX)

## (undated) DEVICE — DRAPE MAYO STAND - (30/CA)

## (undated) DEVICE — NEEDLE SPINAL NON-SAFETY 22GA X 7 (10/BX)

## (undated) DEVICE — SYRINGE 10 ML CONTROL LL (25EA/BX 4BX/CA)

## (undated) DEVICE — SUTURE QUILL 0 PDO 14X14 - (12/BX)

## (undated) DEVICE — SET EXTENSION WITH 2 PORTS (48EA/CA) ***PART #2C8610 IS A SUBSTITUTE*****

## (undated) DEVICE — SET SUCTION/IRRIGATION WITH DISPOSABLE TIP (6/CA )PART #0250-070-520 IS A SUB

## (undated) DEVICE — GLOVE BIOGEL PI ORTHO SZ 7 PF LF (40PR/BX)

## (undated) DEVICE — ARMREST CRADLE FOAM - (2PR/PK 12PR/CA)

## (undated) DEVICE — BLADE SURGICAL CLIPPER - (50EA/CA)

## (undated) DEVICE — DRAPE ARM  BOX OF 20

## (undated) DEVICE — NEPTUNE 4 PORT MANIFOLD - (20/PK)

## (undated) DEVICE — SLEEVE, VASO, THIGH, MED

## (undated) DEVICE — TUBING OUTFLOW HYSTEROSCPY (10EA/BX)

## (undated) DEVICE — ELECTRODE MONOPOLAR ANGLED CUTTING LOOP DIAM 0.35 YELLOW 24FR (6EA/PK)

## (undated) DEVICE — ROBOTIC SURGERY SERVICES

## (undated) DEVICE — SET LEADWIRE 5 LEAD BEDSIDE DISPOSABLE ECG (1SET OF 5/EA)

## (undated) DEVICE — CHLORAPREP 26 ML APPLICATOR - ORANGE TINT(25/CA)

## (undated) DEVICE — GLOVE SZ 7.5 BIOGEL PI MICRO - PF LF (50PR/BX)

## (undated) DEVICE — SOLUTION SORBITOL 3000ML (4/CA)

## (undated) DEVICE — NEEDLE INSUFFLATION FOR STEP - (12/BX)

## (undated) DEVICE — SYRINGE 30 ML LS (56/BX)

## (undated) DEVICE — SUTURE 3-0 MONOCRYL PLUS PS-1 - 27 INCH (36/BX)

## (undated) DEVICE — PAD SANITARY 11IN MAXI IND WRAPPED  (12EA/PK 24PK/CA)

## (undated) DEVICE — FORCEPS PROGRASP DA VINCI 10X'S REUSABLE

## (undated) DEVICE — NEEDLE DRIVER MEGA SUTURECUT DA VINCI 15X'S REUSABLE

## (undated) DEVICE — SUTURE GENERAL

## (undated) DEVICE — PACK GYN DAVINCI (2EA/CA)

## (undated) DEVICE — GOWN SURGEONS X-LARGE - DISP. (30/CA)

## (undated) DEVICE — DRAPE COLUMN  BOX OF 20

## (undated) DEVICE — TROCAR Z THREAD 12 X 100 - BLADED (6/BX)

## (undated) DEVICE — WATER IRRIGATION STERILE 1000ML (12EA/CA)

## (undated) DEVICE — NEEDLE SPINAL NON-SAFETY 18 GA X 3 IN (25EA/BX)

## (undated) DEVICE — DRESSING NON-ADHERING 8 X 3 - (50/BX)

## (undated) DEVICE — GLOVE BIOGEL PI INDICATOR SZ 8.0 SURGICAL PF LF -(50/BX 4BX/CA)

## (undated) DEVICE — SYRINGE SAFETY 10 ML 18 GA X 1 1/2 BLUNT LL (100/BX 4BX/CA)